# Patient Record
Sex: FEMALE | Race: OTHER | Employment: FULL TIME | URBAN - METROPOLITAN AREA
[De-identification: names, ages, dates, MRNs, and addresses within clinical notes are randomized per-mention and may not be internally consistent; named-entity substitution may affect disease eponyms.]

---

## 2020-02-24 ENCOUNTER — OFFICE VISIT (OUTPATIENT)
Dept: FAMILY MEDICINE CLINIC | Facility: CLINIC | Age: 35
End: 2020-02-24

## 2020-02-24 ENCOUNTER — LAB ENCOUNTER (OUTPATIENT)
Dept: LAB | Facility: REFERENCE LAB | Age: 35
End: 2020-02-24
Attending: FAMILY MEDICINE
Payer: COMMERCIAL

## 2020-02-24 VITALS
DIASTOLIC BLOOD PRESSURE: 62 MMHG | BODY MASS INDEX: 24.59 KG/M2 | OXYGEN SATURATION: 98 % | WEIGHT: 153 LBS | SYSTOLIC BLOOD PRESSURE: 104 MMHG | HEART RATE: 70 BPM | HEIGHT: 66 IN

## 2020-02-24 DIAGNOSIS — Z11.3 VENEREAL DISEASE SCREENING: ICD-10-CM

## 2020-02-24 DIAGNOSIS — Z23 NEED FOR VACCINATION: ICD-10-CM

## 2020-02-24 DIAGNOSIS — Z00.00 ENCOUNTER FOR ROUTINE ADULT HEALTH EXAMINATION WITHOUT ABNORMAL FINDINGS: ICD-10-CM

## 2020-02-24 DIAGNOSIS — Z00.00 ENCOUNTER FOR ROUTINE ADULT HEALTH EXAMINATION WITHOUT ABNORMAL FINDINGS: Primary | ICD-10-CM

## 2020-02-24 LAB
ALBUMIN SERPL-MCNC: 3.8 G/DL (ref 3.4–5)
ALBUMIN/GLOB SERPL: 1 {RATIO} (ref 1–2)
ALP LIVER SERPL-CCNC: 51 U/L (ref 37–98)
ALT SERPL-CCNC: 17 U/L (ref 13–56)
ANION GAP SERPL CALC-SCNC: 4 MMOL/L (ref 0–18)
AST SERPL-CCNC: 12 U/L (ref 15–37)
BASOPHILS # BLD AUTO: 0.02 X10(3) UL (ref 0–0.2)
BASOPHILS NFR BLD AUTO: 0.5 %
BILIRUB SERPL-MCNC: 0.3 MG/DL (ref 0.1–2)
BUN BLD-MCNC: 11 MG/DL (ref 7–18)
BUN/CREAT SERPL: 12.9 (ref 10–20)
CALCIUM BLD-MCNC: 8.8 MG/DL (ref 8.5–10.1)
CHLORIDE SERPL-SCNC: 109 MMOL/L (ref 98–112)
CHOLEST SMN-MCNC: 145 MG/DL (ref ?–200)
CO2 SERPL-SCNC: 28 MMOL/L (ref 21–32)
CREAT BLD-MCNC: 0.85 MG/DL (ref 0.55–1.02)
DEPRECATED RDW RBC AUTO: 40.2 FL (ref 35.1–46.3)
EOSINOPHIL # BLD AUTO: 0.07 X10(3) UL (ref 0–0.7)
EOSINOPHIL NFR BLD AUTO: 1.7 %
ERYTHROCYTE [DISTWIDTH] IN BLOOD BY AUTOMATED COUNT: 11.9 % (ref 11–15)
EST. AVERAGE GLUCOSE BLD GHB EST-MCNC: 97 MG/DL (ref 68–126)
GLOBULIN PLAS-MCNC: 3.7 G/DL (ref 2.8–4.4)
GLUCOSE BLD-MCNC: 90 MG/DL (ref 70–99)
HBA1C MFR BLD HPLC: 5 % (ref ?–5.7)
HCT VFR BLD AUTO: 37.6 % (ref 35–48)
HDLC SERPL-MCNC: 51 MG/DL (ref 40–59)
HGB BLD-MCNC: 12.2 G/DL (ref 12–16)
IMM GRANULOCYTES # BLD AUTO: 0.01 X10(3) UL (ref 0–1)
IMM GRANULOCYTES NFR BLD: 0.2 %
LDLC SERPL CALC-MCNC: 75 MG/DL (ref ?–100)
LYMPHOCYTES # BLD AUTO: 1.11 X10(3) UL (ref 1–4)
LYMPHOCYTES NFR BLD AUTO: 26.5 %
M PROTEIN MFR SERPL ELPH: 7.5 G/DL (ref 6.4–8.2)
MCH RBC QN AUTO: 29.8 PG (ref 26–34)
MCHC RBC AUTO-ENTMCNC: 32.4 G/DL (ref 31–37)
MCV RBC AUTO: 91.9 FL (ref 80–100)
MONOCYTES # BLD AUTO: 0.32 X10(3) UL (ref 0.1–1)
MONOCYTES NFR BLD AUTO: 7.6 %
NEUTROPHILS # BLD AUTO: 2.66 X10 (3) UL (ref 1.5–7.7)
NEUTROPHILS # BLD AUTO: 2.66 X10(3) UL (ref 1.5–7.7)
NEUTROPHILS NFR BLD AUTO: 63.5 %
NONHDLC SERPL-MCNC: 94 MG/DL (ref ?–130)
OSMOLALITY SERPL CALC.SUM OF ELEC: 291 MOSM/KG (ref 275–295)
PATIENT FASTING Y/N/NP: NO
PATIENT FASTING Y/N/NP: NO
PLATELET # BLD AUTO: 265 10(3)UL (ref 150–450)
POTASSIUM SERPL-SCNC: 3.9 MMOL/L (ref 3.5–5.1)
RBC # BLD AUTO: 4.09 X10(6)UL (ref 3.8–5.3)
SODIUM SERPL-SCNC: 141 MMOL/L (ref 136–145)
TRIGL SERPL-MCNC: 95 MG/DL (ref 30–149)
VLDLC SERPL CALC-MCNC: 19 MG/DL (ref 0–30)
WBC # BLD AUTO: 4.2 X10(3) UL (ref 4–11)

## 2020-02-24 PROCEDURE — 85025 COMPLETE CBC W/AUTO DIFF WBC: CPT

## 2020-02-24 PROCEDURE — 86780 TREPONEMA PALLIDUM: CPT

## 2020-02-24 PROCEDURE — 80061 LIPID PANEL: CPT

## 2020-02-24 PROCEDURE — 83036 HEMOGLOBIN GLYCOSYLATED A1C: CPT

## 2020-02-24 PROCEDURE — 80053 COMPREHEN METABOLIC PANEL: CPT

## 2020-02-24 PROCEDURE — 87591 N.GONORRHOEAE DNA AMP PROB: CPT

## 2020-02-24 PROCEDURE — 87491 CHLMYD TRACH DNA AMP PROBE: CPT

## 2020-02-24 PROCEDURE — 99385 PREV VISIT NEW AGE 18-39: CPT | Performed by: FAMILY MEDICINE

## 2020-02-24 PROCEDURE — G0438 PPPS, INITIAL VISIT: HCPCS | Performed by: FAMILY MEDICINE

## 2020-02-24 PROCEDURE — 90471 IMMUNIZATION ADMIN: CPT | Performed by: FAMILY MEDICINE

## 2020-02-24 PROCEDURE — 87389 HIV-1 AG W/HIV-1&-2 AB AG IA: CPT

## 2020-02-24 PROCEDURE — 36415 COLL VENOUS BLD VENIPUNCTURE: CPT

## 2020-02-24 PROCEDURE — 90686 IIV4 VACC NO PRSV 0.5 ML IM: CPT | Performed by: FAMILY MEDICINE

## 2020-02-24 NOTE — PATIENT INSTRUCTIONS
Prevention Guidelines, Women Ages 25 to 44  Screening tests and vaccines are an important part of managing your health. A screening test is done to find possible disorders or diseases in people who don't have any symptoms.  The goal is to find a disease e diabetes Lifelong testing every 3 years   Type 2 diabetes All women with prediabetes Every year   Gonorrhea Sexually active women at increased risk for infection At routine exams   Hepatitis C Anyone at increased risk At routine exams   HIV All women joelle Meningococcal Women at increased risk for infection should talk with their healthcare provider 1 or more doses   Pneumococcal conjugate vaccine (PCV13) and pneumococcal polysaccharide vaccine (PPSV23) Women at increased risk for infection should talk wit

## 2020-02-24 NOTE — PROGRESS NOTES
HPI:   Sherwin Gowers is a 37year old female who presents for a complete physical exam.     Last pap: 4 years ago and normal    Menses: Regular, monthly cycles   Contraception: Partner had vasectomy    History of STD's: None  History of intimate partner v EXAM:   Wt Readings from Last 6 Encounters:  02/24/20 : 153 lb (69.4 kg)    Body mass index is 24.69 kg/m².    /62   Pulse 70   Ht 66\"   Wt 153 lb (69.4 kg)   LMP 02/20/2020   SpO2 98%   BMI 24.69 kg/m²     GENERAL: well developed, well judy desires  -Recommendation for yearly influenza vaccine  -Need for Tdap once as an adult and Td booster every 10 years  -Contraception: Partner with vasectomy  -STI screening (GC/Chlamydia/HIV):  Checked today  -Hepatitis C screening for those born between [birth year]

## 2020-02-25 LAB
C TRACH DNA SPEC QL NAA+PROBE: NEGATIVE
N GONORRHOEA DNA SPEC QL NAA+PROBE: NEGATIVE

## 2020-02-26 LAB — T PALLIDUM AB SER QL: NEGATIVE

## 2020-06-06 ENCOUNTER — TELEPHONE (OUTPATIENT)
Dept: FAMILY MEDICINE CLINIC | Facility: CLINIC | Age: 35
End: 2020-06-06

## 2020-06-06 NOTE — TELEPHONE ENCOUNTER
Reports her boyfriend is a  and in his office they have had other workers who tested positive for Covid-19 as recently as Thursday. He was in the same car and sharing the cell phone of one of the police officers as well.  Her boyfriend has not

## 2020-09-02 ENCOUNTER — HOSPITAL ENCOUNTER (OUTPATIENT)
Age: 35
Discharge: HOME | End: 2020-09-02
Payer: COMMERCIAL

## 2020-09-02 VITALS
SYSTOLIC BLOOD PRESSURE: 124 MMHG | OXYGEN SATURATION: 100 % | BODY MASS INDEX: 24.91 KG/M2 | HEIGHT: 66 IN | RESPIRATION RATE: 16 BRPM | WEIGHT: 155 LBS | DIASTOLIC BLOOD PRESSURE: 75 MMHG | TEMPERATURE: 97 F | HEART RATE: 75 BPM

## 2020-09-02 DIAGNOSIS — R21 RASH OF GENITAL AREA: Primary | ICD-10-CM

## 2020-09-02 PROCEDURE — 99203 OFFICE O/P NEW LOW 30 MIN: CPT | Performed by: EMERGENCY MEDICINE

## 2020-09-02 RX ORDER — VALACYCLOVIR HYDROCHLORIDE 1 G/1
1 TABLET, FILM COATED ORAL EVERY 12 HOURS
Qty: 20 TABLET | Refills: 0 | Status: SHIPPED | OUTPATIENT
Start: 2020-09-02 | End: 2020-09-12

## 2020-09-02 NOTE — ED INITIAL ASSESSMENT (HPI)
Per pt noticed rash to pelvic area about 3 weeks reports worse in the last 2-3 days. Pt reports some itching.

## 2020-09-03 LAB
HSV1 DNA SPEC QL NAA+PROBE: NEGATIVE
HSV2 DNA SPEC QL NAA+PROBE: NEGATIVE

## 2020-09-03 NOTE — ED PROVIDER NOTES
Patient Seen in: [address]      History   Patient presents with:  Rash Skin Problem    Stated Complaint: Emelina Byrne is a 37year old female here for for rash to her pelvic area that started about 3 weeks ago Current:/75   Pulse 75   Temp 97 °F (36.1 °C) (Tympanic)   Resp 16   Ht 167.6 cm (5' 6\")   Wt 70.3 kg   LMP 08/30/2020   SpO2 100%   BMI 25.02 kg/m²         Physical Exam  Vitals signs and nursing note reviewed.    Constitutional:       Appearance: N Discharge Medication List as of 9/2/2020  5:01 PM    START taking these medications    valACYclovir HCl 1 G Oral Tab  Take 1 tablet (1,000 mg total) by mouth every 12 (twelve) hours for 10 days. , Normal, Disp-20 tablet, R-0

## 2020-09-16 ENCOUNTER — OFFICE VISIT (OUTPATIENT)
Dept: FAMILY MEDICINE CLINIC | Facility: CLINIC | Age: 35
End: 2020-09-16

## 2020-09-16 VITALS
SYSTOLIC BLOOD PRESSURE: 122 MMHG | HEART RATE: 81 BPM | DIASTOLIC BLOOD PRESSURE: 70 MMHG | TEMPERATURE: 98 F | WEIGHT: 153 LBS | BODY MASS INDEX: 24.59 KG/M2 | OXYGEN SATURATION: 98 % | HEIGHT: 66 IN

## 2020-09-16 DIAGNOSIS — R23.8 VESICULAR RASH: Primary | ICD-10-CM

## 2020-09-16 PROCEDURE — 99213 OFFICE O/P EST LOW 20 MIN: CPT | Performed by: FAMILY MEDICINE

## 2020-09-16 PROCEDURE — 3074F SYST BP LT 130 MM HG: CPT | Performed by: FAMILY MEDICINE

## 2020-09-16 PROCEDURE — 3078F DIAST BP <80 MM HG: CPT | Performed by: FAMILY MEDICINE

## 2020-09-16 PROCEDURE — 1111F DSCHRG MED/CURRENT MED MERGE: CPT | Performed by: FAMILY MEDICINE

## 2020-09-16 PROCEDURE — 3008F BODY MASS INDEX DOCD: CPT | Performed by: FAMILY MEDICINE

## 2020-09-16 NOTE — PROGRESS NOTES
HPI:    Patient ID: Alva Calloway is a 37year old female who presents for groin rash. HPI  Rash started more than 4 weeks ago in groin area. Fluctuates. No pain. Itches. Looks like small red bumps. Went to UC on 9/2 and dx with herpes.     Was diagnosis)    1. Vesicular rash  -Reviewed IC notes and results.   -Possibly 2/2 genital HSV despite negative culture. Vesicles are very small thus sample was likely difficult to obtain.   -Currently taking valtrex 1g BID for 10-day course.  Will complete a

## 2020-10-07 ENCOUNTER — IMMUNIZATION (OUTPATIENT)
Dept: FAMILY MEDICINE CLINIC | Facility: CLINIC | Age: 35
End: 2020-10-07

## 2020-10-07 DIAGNOSIS — Z23 NEED FOR VACCINATION: ICD-10-CM

## 2020-10-07 PROCEDURE — 90686 IIV4 VACC NO PRSV 0.5 ML IM: CPT | Performed by: FAMILY MEDICINE

## 2020-10-07 PROCEDURE — 90471 IMMUNIZATION ADMIN: CPT | Performed by: FAMILY MEDICINE

## 2021-02-03 ENCOUNTER — OFFICE VISIT (OUTPATIENT)
Dept: FAMILY MEDICINE CLINIC | Facility: CLINIC | Age: 36
End: 2021-02-03

## 2021-02-03 VITALS
OXYGEN SATURATION: 98 % | DIASTOLIC BLOOD PRESSURE: 74 MMHG | BODY MASS INDEX: 24.27 KG/M2 | HEIGHT: 66 IN | HEART RATE: 81 BPM | SYSTOLIC BLOOD PRESSURE: 120 MMHG | WEIGHT: 151 LBS

## 2021-02-03 DIAGNOSIS — M54.16 LUMBAR RADICULOPATHY: ICD-10-CM

## 2021-02-03 DIAGNOSIS — M25.542 ARTHRALGIA OF BOTH HANDS: ICD-10-CM

## 2021-02-03 DIAGNOSIS — M25.541 ARTHRALGIA OF BOTH HANDS: ICD-10-CM

## 2021-02-03 DIAGNOSIS — Z00.00 ENCOUNTER FOR ROUTINE ADULT HEALTH EXAMINATION WITHOUT ABNORMAL FINDINGS: Primary | ICD-10-CM

## 2021-02-03 DIAGNOSIS — R09.81 NASAL CONGESTION: ICD-10-CM

## 2021-02-03 DIAGNOSIS — M54.12 CERVICAL RADICULOPATHY: ICD-10-CM

## 2021-02-03 DIAGNOSIS — Z12.4 PAP SMEAR FOR CERVICAL CANCER SCREENING: ICD-10-CM

## 2021-02-03 PROCEDURE — 87624 HPV HI-RISK TYP POOLED RSLT: CPT | Performed by: FAMILY MEDICINE

## 2021-02-03 PROCEDURE — 88175 CYTOPATH C/V AUTO FLUID REDO: CPT | Performed by: FAMILY MEDICINE

## 2021-02-03 PROCEDURE — 3008F BODY MASS INDEX DOCD: CPT | Performed by: FAMILY MEDICINE

## 2021-02-03 PROCEDURE — 99395 PREV VISIT EST AGE 18-39: CPT | Performed by: FAMILY MEDICINE

## 2021-02-03 PROCEDURE — 3078F DIAST BP <80 MM HG: CPT | Performed by: FAMILY MEDICINE

## 2021-02-03 PROCEDURE — 3074F SYST BP LT 130 MM HG: CPT | Performed by: FAMILY MEDICINE

## 2021-02-03 NOTE — PROGRESS NOTES
HPI:   Jonny Zarate is a 31year old female who presents for a complete physical exam.     Has had more pain in her joints, especially her hands and feet. The pain fluctuates and was constant for months last year.  Does have arthritis in the family but no • Hypertension Mother    • Cancer Mother    • Lipids Maternal Grandmother    • Arthritis Maternal Grandmother       Social History:   Social History    Tobacco Use      Smoking status: Never Smoker      Smokeless tobacco: Never Used    Alcohol use: Not C Pep. IGG [E]      HCV Antibody [E]      Lipid Panel [E]      Hemoglobin A1C (Glycohemoglobin) [E]      Comp Metabolic Panel (14) [E]      CBC W Differential W Platelet [E]      Hpv High Risk , Thin Prep Collection      ThinPrep PAP Smear [E]    Referral to

## 2021-02-03 NOTE — PATIENT INSTRUCTIONS
Prevention Guidelines, Women Ages 25 to 44  Screening tests and vaccines are an important part of managing your health. A screening test is done to find possible disorders or diseases in people who don't have any symptoms.  The goal is to find a disease e Anyone at increased risk  At routine exams   HIV All women At routine exams3     Obesity All women in this age group  At routine exams   Syphilis Women at increased risk for infection should talk with their healthcare provider  At routine exams   Tuberculo (protects against 13 types of pneumococcal bacteria)   PPSV23: 1 to 2 doses through age 59, or 1 dose at 72 or older (protects against 23 types of pneumococcal bacteria)    Tetanus/diphtheria/pertussis (Td/Tdap) booster All women in this age group  Td ever not intended as a substitute for professional medical care. Always follow your healthcare professional's instructions.

## 2021-02-04 ENCOUNTER — LABORATORY ENCOUNTER (OUTPATIENT)
Dept: LAB | Facility: REFERENCE LAB | Age: 36
End: 2021-02-04
Attending: FAMILY MEDICINE
Payer: COMMERCIAL

## 2021-02-04 DIAGNOSIS — M25.542 ARTHRALGIA OF BOTH HANDS: ICD-10-CM

## 2021-02-04 DIAGNOSIS — Z00.00 ENCOUNTER FOR ROUTINE ADULT HEALTH EXAMINATION WITHOUT ABNORMAL FINDINGS: ICD-10-CM

## 2021-02-04 DIAGNOSIS — M25.541 ARTHRALGIA OF BOTH HANDS: ICD-10-CM

## 2021-02-04 LAB
ALBUMIN SERPL-MCNC: 3.8 G/DL (ref 3.4–5)
ALBUMIN/GLOB SERPL: 1 {RATIO} (ref 1–2)
ALP LIVER SERPL-CCNC: 53 U/L
ALT SERPL-CCNC: 19 U/L
ANION GAP SERPL CALC-SCNC: 2 MMOL/L (ref 0–18)
AST SERPL-CCNC: 11 U/L (ref 15–37)
BASOPHILS # BLD AUTO: 0.02 X10(3) UL (ref 0–0.2)
BASOPHILS NFR BLD AUTO: 0.4 %
BILIRUB SERPL-MCNC: 0.3 MG/DL (ref 0.1–2)
BUN BLD-MCNC: 16 MG/DL (ref 7–18)
BUN/CREAT SERPL: 20.3 (ref 10–20)
CALCIUM BLD-MCNC: 9.1 MG/DL (ref 8.5–10.1)
CHLORIDE SERPL-SCNC: 109 MMOL/L (ref 98–112)
CHOLEST SMN-MCNC: 161 MG/DL (ref ?–200)
CO2 SERPL-SCNC: 29 MMOL/L (ref 21–32)
CREAT BLD-MCNC: 0.79 MG/DL
DEPRECATED RDW RBC AUTO: 39.3 FL (ref 35.1–46.3)
EOSINOPHIL # BLD AUTO: 0.06 X10(3) UL (ref 0–0.7)
EOSINOPHIL NFR BLD AUTO: 1.2 %
ERYTHROCYTE [DISTWIDTH] IN BLOOD BY AUTOMATED COUNT: 11.7 % (ref 11–15)
EST. AVERAGE GLUCOSE BLD GHB EST-MCNC: 91 MG/DL (ref 68–126)
GLOBULIN PLAS-MCNC: 4 G/DL (ref 2.8–4.4)
GLUCOSE BLD-MCNC: 92 MG/DL (ref 70–99)
HBA1C MFR BLD HPLC: 4.8 % (ref ?–5.7)
HCT VFR BLD AUTO: 39.5 %
HCV AB SERPL QL IA: NONREACTIVE
HDLC SERPL-MCNC: 70 MG/DL (ref 40–59)
HGB BLD-MCNC: 12.8 G/DL
HPV I/H RISK 1 DNA SPEC QL NAA+PROBE: NEGATIVE
IMM GRANULOCYTES # BLD AUTO: 0.02 X10(3) UL (ref 0–1)
IMM GRANULOCYTES NFR BLD: 0.4 %
LDLC SERPL CALC-MCNC: 78 MG/DL (ref ?–100)
LYMPHOCYTES # BLD AUTO: 1.12 X10(3) UL (ref 1–4)
LYMPHOCYTES NFR BLD AUTO: 22.4 %
M PROTEIN MFR SERPL ELPH: 7.8 G/DL (ref 6.4–8.2)
MCH RBC QN AUTO: 29.8 PG (ref 26–34)
MCHC RBC AUTO-ENTMCNC: 32.4 G/DL (ref 31–37)
MCV RBC AUTO: 91.9 FL
MONOCYTES # BLD AUTO: 0.37 X10(3) UL (ref 0.1–1)
MONOCYTES NFR BLD AUTO: 7.4 %
NEUTROPHILS # BLD AUTO: 3.41 X10 (3) UL (ref 1.5–7.7)
NEUTROPHILS # BLD AUTO: 3.41 X10(3) UL (ref 1.5–7.7)
NEUTROPHILS NFR BLD AUTO: 68.2 %
NONHDLC SERPL-MCNC: 91 MG/DL (ref ?–130)
OSMOLALITY SERPL CALC.SUM OF ELEC: 291 MOSM/KG (ref 275–295)
PATIENT FASTING Y/N/NP: YES
PATIENT FASTING Y/N/NP: YES
PLATELET # BLD AUTO: 248 10(3)UL (ref 150–450)
POTASSIUM SERPL-SCNC: 4.1 MMOL/L (ref 3.5–5.1)
RBC # BLD AUTO: 4.3 X10(6)UL
RHEUMATOID FACT SERPL-ACNC: <10 IU/ML (ref ?–15)
SODIUM SERPL-SCNC: 140 MMOL/L (ref 136–145)
TRIGL SERPL-MCNC: 66 MG/DL (ref 30–149)
VLDLC SERPL CALC-MCNC: 13 MG/DL (ref 0–30)
WBC # BLD AUTO: 5 X10(3) UL (ref 4–11)

## 2021-02-04 PROCEDURE — 85025 COMPLETE CBC W/AUTO DIFF WBC: CPT

## 2021-02-04 PROCEDURE — 86803 HEPATITIS C AB TEST: CPT

## 2021-02-04 PROCEDURE — 80053 COMPREHEN METABOLIC PANEL: CPT

## 2021-02-04 PROCEDURE — 36415 COLL VENOUS BLD VENIPUNCTURE: CPT

## 2021-02-04 PROCEDURE — 83036 HEMOGLOBIN GLYCOSYLATED A1C: CPT

## 2021-02-04 PROCEDURE — 86431 RHEUMATOID FACTOR QUANT: CPT

## 2021-02-04 PROCEDURE — 86200 CCP ANTIBODY: CPT

## 2021-02-04 PROCEDURE — 80061 LIPID PANEL: CPT

## 2021-02-05 LAB — CCP IGG SERPL-ACNC: 1 U/ML (ref 0–6.9)

## 2021-02-08 LAB
LAST PAP RESULT: NORMAL
PAP HISTORY (OTHER THAN LAST PAP): NORMAL

## 2021-02-22 ENCOUNTER — OFFICE VISIT (OUTPATIENT)
Dept: OTOLARYNGOLOGY | Facility: CLINIC | Age: 36
End: 2021-02-22

## 2021-02-22 VITALS
BODY MASS INDEX: 24.27 KG/M2 | DIASTOLIC BLOOD PRESSURE: 76 MMHG | TEMPERATURE: 97 F | HEIGHT: 66 IN | SYSTOLIC BLOOD PRESSURE: 121 MMHG | WEIGHT: 151 LBS

## 2021-02-22 DIAGNOSIS — J34.2 DEVIATED NASAL SEPTUM: Primary | ICD-10-CM

## 2021-02-22 DIAGNOSIS — R09.81 NASAL CONGESTION: ICD-10-CM

## 2021-02-22 PROCEDURE — 3008F BODY MASS INDEX DOCD: CPT | Performed by: OTOLARYNGOLOGY

## 2021-02-22 PROCEDURE — 3078F DIAST BP <80 MM HG: CPT | Performed by: OTOLARYNGOLOGY

## 2021-02-22 PROCEDURE — 3074F SYST BP LT 130 MM HG: CPT | Performed by: OTOLARYNGOLOGY

## 2021-02-22 PROCEDURE — 99243 OFF/OP CNSLTJ NEW/EST LOW 30: CPT | Performed by: OTOLARYNGOLOGY

## 2021-02-22 RX ORDER — AZELASTINE 1 MG/ML
2 SPRAY, METERED NASAL 2 TIMES DAILY
Qty: 1 BOTTLE | Refills: 0 | Status: SHIPPED | OUTPATIENT
Start: 2021-02-22 | End: 2021-04-19

## 2021-02-22 RX ORDER — AMOXICILLIN 500 MG/1
500 CAPSULE ORAL EVERY 6 HOURS
COMMUNITY
Start: 2021-02-19 | End: 2021-03-23 | Stop reason: ALTCHOICE

## 2021-02-22 RX ORDER — MONTELUKAST SODIUM 10 MG/1
10 TABLET ORAL NIGHTLY
Qty: 30 TABLET | Refills: 3 | Status: SHIPPED | OUTPATIENT
Start: 2021-02-22 | End: 2021-03-23

## 2021-02-22 NOTE — PROGRESS NOTES
Gaston Stroud is a 31year old female.   Patient presents with:  Sinus Problem: Patient Presents with: Trouble breathing though nose   Ear Problem: swelling in both ears during night time for a while       HISTORY OF PRESENT ILLNESS  Difficulty breathing f depression. Integumentary Negative Frequent skin infections, pigment change and rash. Hema/Lymph Negative Easy bleeding and easy bruising.            PHYSICAL EXAM    /76 (BP Location: Left arm, Patient Position: Sitting, Cuff Size: adult)   Temp to see me in 1 month for reevaluation and discussion regarding further management of her deviated septum as her biggest complaints are obstructive in nature. This note was prepared using [employer] voice recognition dictation software.  As a resu

## 2021-03-22 ENCOUNTER — OFFICE VISIT (OUTPATIENT)
Dept: OTOLARYNGOLOGY | Facility: CLINIC | Age: 36
End: 2021-03-22

## 2021-03-22 VITALS
SYSTOLIC BLOOD PRESSURE: 117 MMHG | DIASTOLIC BLOOD PRESSURE: 75 MMHG | BODY MASS INDEX: 27.79 KG/M2 | WEIGHT: 151 LBS | HEIGHT: 62 IN | TEMPERATURE: 97 F

## 2021-03-22 DIAGNOSIS — J34.2 DEVIATED NASAL SEPTUM: Primary | ICD-10-CM

## 2021-03-22 PROCEDURE — 99214 OFFICE O/P EST MOD 30 MIN: CPT | Performed by: OTOLARYNGOLOGY

## 2021-03-22 PROCEDURE — 3074F SYST BP LT 130 MM HG: CPT | Performed by: OTOLARYNGOLOGY

## 2021-03-22 PROCEDURE — 3008F BODY MASS INDEX DOCD: CPT | Performed by: OTOLARYNGOLOGY

## 2021-03-22 PROCEDURE — 3078F DIAST BP <80 MM HG: CPT | Performed by: OTOLARYNGOLOGY

## 2021-03-23 ENCOUNTER — TELEPHONE (OUTPATIENT)
Dept: NEUROLOGY | Facility: CLINIC | Age: 36
End: 2021-03-23

## 2021-03-23 ENCOUNTER — HOSPITAL ENCOUNTER (OUTPATIENT)
Dept: GENERAL RADIOLOGY | Facility: HOSPITAL | Age: 36
Discharge: HOME | End: 2021-03-23
Attending: PHYSICAL MEDICINE & REHABILITATION
Payer: COMMERCIAL

## 2021-03-23 ENCOUNTER — OFFICE VISIT (OUTPATIENT)
Dept: NEUROLOGY | Facility: CLINIC | Age: 36
End: 2021-03-23

## 2021-03-23 VITALS — HEIGHT: 65 IN | WEIGHT: 150 LBS | BODY MASS INDEX: 24.99 KG/M2

## 2021-03-23 DIAGNOSIS — M54.12 CERVICAL RADICULOPATHY: ICD-10-CM

## 2021-03-23 DIAGNOSIS — M54.41 CHRONIC BILATERAL LOW BACK PAIN WITH BILATERAL SCIATICA: ICD-10-CM

## 2021-03-23 DIAGNOSIS — G89.29 CHRONIC BILATERAL THORACIC BACK PAIN: ICD-10-CM

## 2021-03-23 DIAGNOSIS — M54.12 CERVICAL RADICULOPATHY: Primary | ICD-10-CM

## 2021-03-23 DIAGNOSIS — M54.2 NECK PAIN: ICD-10-CM

## 2021-03-23 DIAGNOSIS — G89.29 CHRONIC BILATERAL LOW BACK PAIN WITH BILATERAL SCIATICA: ICD-10-CM

## 2021-03-23 DIAGNOSIS — M54.42 CHRONIC BILATERAL LOW BACK PAIN WITH BILATERAL SCIATICA: ICD-10-CM

## 2021-03-23 DIAGNOSIS — M54.6 CHRONIC BILATERAL THORACIC BACK PAIN: ICD-10-CM

## 2021-03-23 PROCEDURE — 72050 X-RAY EXAM NECK SPINE 4/5VWS: CPT | Performed by: PHYSICAL MEDICINE & REHABILITATION

## 2021-03-23 PROCEDURE — 3008F BODY MASS INDEX DOCD: CPT | Performed by: PHYSICAL MEDICINE & REHABILITATION

## 2021-03-23 PROCEDURE — 99244 OFF/OP CNSLTJ NEW/EST MOD 40: CPT | Performed by: PHYSICAL MEDICINE & REHABILITATION
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
## 2021-03-23 NOTE — PATIENT INSTRUCTIONS
Plan  She will get cervical spine x-rays. She will get a MRI of the cervical spine. She will start PT on the cervical and thoracic spines. I will address her low back issues once her neck is doing better.     She will follow up in 2 months or rajiv

## 2021-03-23 NOTE — TELEPHONE ENCOUNTER
Sent clinical notes to [facility] for authorization of 8 PT sessions CPT Codes: 86639,08432.

## 2021-03-23 NOTE — PROGRESS NOTES
Rachid Back is a 31year old female. Patient presents with:   Follow - Up: Patient here for f/u regarding Deviated nasal septum, per pt  breathing is better with medications      HISTORY OF PRESENT ILLNESS  Difficulty breathing from her nose all the eliana Transportation (Medical):       Lack of Transportation (Non-Medical):   Physical Activity:       Days of Exercise per Week:       Minutes of Exercise per Session:   Stress:       Feeling of Stress :   Social Connections:       Frequency of Communication wi Exam Normal Inspection - Normal. Palpation - Normal. Parotid gland - Normal. Thyroid gland - Normal.   Eyes Normal Conjunctiva - Right: Normal, Left: Normal. Pupil - Right: Normal, Left: Normal. Fundus - Right: Normal, Left: Normal.   Neurological Normal M discussed an alternative in the form of septoplasty and turbinate reduction.   We specifically discussed the risk of surgery to include but not be limited to postoperative pain, bleeding, the anesthesia use as well as persistent nasal obstruction despite brown
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
## 2021-03-24 NOTE — PATIENT INSTRUCTIONS
Depression  Depression is one of the most common mental health problems today. It is not just a state of unhappiness or sadness. It is a true disease. The cause seems to be linked to a drop in chemicals that transmit signals in the brain.  Having a family (low in saturated fat and high in fruits and vegetables). Exercise at least 3 times a week for 30 minutes. Even mild to moderate exercise (like brisk walking) can make you feel better.   · Don't make major decisions, such as a job change, a divorce, or a ma hear  · Seeing things that others do not see  · Not sleeping or eating for 3 days in a row  · Having friends or family express concern over your behavior and ask you to seek help  Dyllan last reviewed this educational content on 7/1/2020 © 2000-2020 The

## 2021-03-25 NOTE — TELEPHONE ENCOUNTER
Received notice of Approval from [address] [place] [address] at [place] for 8 visits of PT valid until 6/25/21 to be done at [facility]    Left detailed message informing patient of the above and provided # to [facility] PT.  mychart message also sent to patient

## 2021-03-26 ENCOUNTER — TELEPHONE (OUTPATIENT)
Dept: NEUROLOGY | Facility: CLINIC | Age: 36
End: 2021-03-26

## 2021-03-26 ENCOUNTER — PATIENT MESSAGE (OUTPATIENT)
Dept: NEUROLOGY | Facility: CLINIC | Age: 36
End: 2021-03-26

## 2021-03-26 DIAGNOSIS — M54.2 NECK PAIN: Primary | ICD-10-CM

## 2021-03-26 RX ORDER — CYCLOBENZAPRINE HCL 10 MG
10 TABLET ORAL 2 TIMES DAILY PRN
Qty: 30 TABLET | Refills: 0 | Status: SHIPPED | OUTPATIENT
Start: 2021-03-26 | End: 2021-04-19

## 2021-03-26 RX ORDER — METHYLPREDNISOLONE 4 MG/1
TABLET ORAL
Qty: 1 PACKAGE | Refills: 0 | Status: SHIPPED | OUTPATIENT
Start: 2021-03-26 | End: 2021-04-19

## 2021-03-26 NOTE — TELEPHONE ENCOUNTER
From: Disha Krause  To: Yaritza Jackson MD  Sent: 3/26/2021 4:21 AM CDT  Subject: Prescription Question    It is 4:18am at the moment and I have been up since 2 when the pain woke me up. Is there something you can prescribe to help me with this pain.  My

## 2021-03-26 NOTE — TELEPHONE ENCOUNTER
Patient stated she scheduled PT and has not started. Patient has MRI scheduled and completed Xray. Patient states she Ibuprofen but does not help. Patient states she has a hard time getting out of bed. Patient rates pain 9/10. No loss of B/B function.  Xenia

## 2021-03-26 NOTE — TELEPHONE ENCOUNTER
Per Dr Rikki Sparks - check if patient has neck or back pain and if pain is radiating.  Can prescribe Medrol dose timothy and a muscle relaxer       Spoke with Patient who stated she is experiencing  intense pain on her upper left side of her neck radiating from the

## 2021-03-27 ENCOUNTER — HOSPITAL ENCOUNTER (OUTPATIENT)
Dept: MRI IMAGING | Facility: HOSPITAL | Age: 36
Discharge: HOME | End: 2021-03-27
Attending: PHYSICAL MEDICINE & REHABILITATION
Payer: COMMERCIAL

## 2021-03-27 DIAGNOSIS — M54.2 NECK PAIN: ICD-10-CM

## 2021-03-27 DIAGNOSIS — M54.12 CERVICAL RADICULOPATHY: ICD-10-CM

## 2021-03-27 PROCEDURE — 72141 MRI NECK SPINE W/O DYE: CPT | Performed by: PHYSICAL MEDICINE & REHABILITATION

## 2021-03-29 ENCOUNTER — TELEPHONE (OUTPATIENT)
Dept: NEUROLOGY | Facility: CLINIC | Age: 36
End: 2021-03-29

## 2021-03-29 NOTE — TELEPHONE ENCOUNTER
----- Message from Jayjay Jimenez MD sent at 3/26/2021  3:13 PM CDT -----  She has straightening of the cervical spine but otherwise, her x-ray is normal.

## 2021-03-29 NOTE — TELEPHONE ENCOUNTER
LMTCB    Xray: \"She has straightening of the cervical spine but otherwise, her x-ray is normal.\"

## 2021-03-30 PROBLEM — M48.02 CERVICAL STENOSIS OF SPINE: Status: ACTIVE | Noted: 2021-03-30

## 2021-03-30 PROBLEM — M50.20 CERVICAL HERNIATED DISC: Status: ACTIVE | Noted: 2021-03-30

## 2021-03-30 PROBLEM — M50.90 CERVICAL DISC DISEASE: Status: ACTIVE | Noted: 2021-03-30

## 2021-03-30 NOTE — TELEPHONE ENCOUNTER
I reviewed her MRI scan and she has 2 herniated discs and 2 bulging discs. I would like to see how she does with 3-4 weeks of the PT. I would like for her to give me an update at that time.

## 2021-03-30 NOTE — TELEPHONE ENCOUNTER
S/W patient and she verbalized she understood the MRI and Xray. Patient states she will let us know how she does with PT in a few weeks. Patient states the steroid has helped with the back pain but does cause mild dizziness.

## 2021-04-08 ENCOUNTER — TELEPHONE (OUTPATIENT)
Dept: PHYSICAL THERAPY | Facility: HOSPITAL | Age: 36
End: 2021-04-08

## 2021-04-09 ENCOUNTER — OFFICE VISIT (OUTPATIENT)
Dept: PHYSICAL THERAPY | Facility: HOSPITAL | Age: 36
End: 2021-04-09
Attending: PHYSICAL MEDICINE & REHABILITATION
Payer: COMMERCIAL

## 2021-04-09 DIAGNOSIS — G89.29 CHRONIC BILATERAL LOW BACK PAIN WITH BILATERAL SCIATICA: ICD-10-CM

## 2021-04-09 DIAGNOSIS — M54.12 CERVICAL RADICULOPATHY: ICD-10-CM

## 2021-04-09 DIAGNOSIS — M54.2 NECK PAIN: ICD-10-CM

## 2021-04-09 DIAGNOSIS — M54.6 CHRONIC BILATERAL THORACIC BACK PAIN: ICD-10-CM

## 2021-04-09 DIAGNOSIS — G89.29 CHRONIC BILATERAL THORACIC BACK PAIN: ICD-10-CM

## 2021-04-09 DIAGNOSIS — M54.41 CHRONIC BILATERAL LOW BACK PAIN WITH BILATERAL SCIATICA: ICD-10-CM

## 2021-04-09 DIAGNOSIS — M54.42 CHRONIC BILATERAL LOW BACK PAIN WITH BILATERAL SCIATICA: ICD-10-CM

## 2021-04-09 PROCEDURE — 97110 THERAPEUTIC EXERCISES: CPT

## 2021-04-09 PROCEDURE — 97162 PT EVAL MOD COMPLEX 30 MIN: CPT

## 2021-04-09 NOTE — PROGRESS NOTES
SPINE EVALUATION:   Referring Physician: Dr. Lucia Samuels  Diagnosis:  Cervical radiculopathy (M54.12)  Neck pain (M54.2)  Chronic bilateral thoracic back pain (M54.6,G89.29)  Chronic bilateral low back pain with bilateral sciatica (M54.42,M54.41,G89.29)    Date reviewed with Wenda Ormond. Significant findings include  has a past medical history of Cervical herniated disc and Postpartum depression.     Nafisa Vazquez presents to physical therapy evaluation with primary c/o intermittent pain in B neck and (L) sc and JOSEPH with precautions    Charges: PT Eval Moderate Complexity, There Ex 1      Total Timed Treatment: 15 min     Total Treatment Time: 50 min     Based on clinical rationale and outcome measures, this evaluation involved Moderate Complexity decision ma care.    X___________________________________________________ Date____________________    Certification From: 6/7/4269  To:7/8/2021

## 2021-04-12 ENCOUNTER — OFFICE VISIT (OUTPATIENT)
Dept: PHYSICAL THERAPY | Facility: HOSPITAL | Age: 36
End: 2021-04-12
Attending: PHYSICAL MEDICINE & REHABILITATION
Payer: COMMERCIAL

## 2021-04-12 DIAGNOSIS — G89.29 CHRONIC BILATERAL THORACIC BACK PAIN: ICD-10-CM

## 2021-04-12 DIAGNOSIS — M54.42 CHRONIC BILATERAL LOW BACK PAIN WITH BILATERAL SCIATICA: ICD-10-CM

## 2021-04-12 DIAGNOSIS — M54.12 CERVICAL RADICULOPATHY: ICD-10-CM

## 2021-04-12 DIAGNOSIS — G89.29 CHRONIC BILATERAL LOW BACK PAIN WITH BILATERAL SCIATICA: ICD-10-CM

## 2021-04-12 DIAGNOSIS — M54.41 CHRONIC BILATERAL LOW BACK PAIN WITH BILATERAL SCIATICA: ICD-10-CM

## 2021-04-12 DIAGNOSIS — M54.2 NECK PAIN: ICD-10-CM

## 2021-04-12 DIAGNOSIS — M54.6 CHRONIC BILATERAL THORACIC BACK PAIN: ICD-10-CM

## 2021-04-12 PROCEDURE — 97140 MANUAL THERAPY 1/> REGIONS: CPT

## 2021-04-12 PROCEDURE — 97110 THERAPEUTIC EXERCISES: CPT

## 2021-04-12 NOTE — PROGRESS NOTES
Diagnosis:  Cervical radiculopathy (M54.12)  Neck pain (M54.2)  Chronic bilateral thoracic back pain (M54.6,G89.29)  Chronic bilateral low back pain with bilateral sciatica (M54.42,M54.41,G89.29)   Precautions: none  Insurance Type (# Auth): Paula Mullen [employer] O (8) T

## 2021-04-15 ENCOUNTER — PATIENT MESSAGE (OUTPATIENT)
Dept: FAMILY MEDICINE CLINIC | Facility: CLINIC | Age: 36
End: 2021-04-15

## 2021-04-16 ENCOUNTER — OFFICE VISIT (OUTPATIENT)
Dept: PHYSICAL THERAPY | Facility: HOSPITAL | Age: 36
End: 2021-04-16
Attending: PHYSICAL MEDICINE & REHABILITATION
Payer: COMMERCIAL

## 2021-04-16 DIAGNOSIS — M54.2 NECK PAIN: ICD-10-CM

## 2021-04-16 DIAGNOSIS — G89.29 CHRONIC BILATERAL LOW BACK PAIN WITH BILATERAL SCIATICA: ICD-10-CM

## 2021-04-16 DIAGNOSIS — M54.12 CERVICAL RADICULOPATHY: ICD-10-CM

## 2021-04-16 DIAGNOSIS — M54.6 CHRONIC BILATERAL THORACIC BACK PAIN: ICD-10-CM

## 2021-04-16 DIAGNOSIS — M54.41 CHRONIC BILATERAL LOW BACK PAIN WITH BILATERAL SCIATICA: ICD-10-CM

## 2021-04-16 DIAGNOSIS — M54.42 CHRONIC BILATERAL LOW BACK PAIN WITH BILATERAL SCIATICA: ICD-10-CM

## 2021-04-16 DIAGNOSIS — G89.29 CHRONIC BILATERAL THORACIC BACK PAIN: ICD-10-CM

## 2021-04-16 PROCEDURE — 97110 THERAPEUTIC EXERCISES: CPT

## 2021-04-16 PROCEDURE — 97140 MANUAL THERAPY 1/> REGIONS: CPT

## 2021-04-16 NOTE — TELEPHONE ENCOUNTER
Pt contacted and scheduled for video visit 4/19. Natividad Grover DO 4/15/2021 11:33 PM CDT    Please schedule video visit follow-up to discuss.   ----- Message -----  From: Karl Aguayo RN  Sent: 4/15/2021   4:38 PM CDT  To: Kendrick Edwards DO  Subject: FW

## 2021-04-16 NOTE — PROGRESS NOTES
Diagnosis:  Cervical radiculopathy (M54.12)  Neck pain (M54.2)  Chronic bilateral thoracic back pain (M54.6,G89.29)  Chronic bilateral low back pain with bilateral sciatica (M54.42,M54.41,G89.29)   Precautions: none  Insurance Type (# Auth): [facility] (8) T central mid cervical

## 2021-04-19 ENCOUNTER — OFFICE VISIT (OUTPATIENT)
Dept: PHYSICAL THERAPY | Facility: HOSPITAL | Age: 36
End: 2021-04-19
Attending: PHYSICAL MEDICINE & REHABILITATION
Payer: COMMERCIAL

## 2021-04-19 DIAGNOSIS — M54.6 CHRONIC BILATERAL THORACIC BACK PAIN: ICD-10-CM

## 2021-04-19 DIAGNOSIS — G89.29 CHRONIC BILATERAL THORACIC BACK PAIN: ICD-10-CM

## 2021-04-19 DIAGNOSIS — M54.2 NECK PAIN: ICD-10-CM

## 2021-04-19 DIAGNOSIS — M54.42 CHRONIC BILATERAL LOW BACK PAIN WITH BILATERAL SCIATICA: ICD-10-CM

## 2021-04-19 DIAGNOSIS — G89.29 CHRONIC BILATERAL LOW BACK PAIN WITH BILATERAL SCIATICA: ICD-10-CM

## 2021-04-19 DIAGNOSIS — M54.41 CHRONIC BILATERAL LOW BACK PAIN WITH BILATERAL SCIATICA: ICD-10-CM

## 2021-04-19 DIAGNOSIS — M54.12 CERVICAL RADICULOPATHY: ICD-10-CM

## 2021-04-19 PROBLEM — F41.9 ANXIETY: Status: ACTIVE | Noted: 2021-04-19

## 2021-04-19 PROCEDURE — 97140 MANUAL THERAPY 1/> REGIONS: CPT

## 2021-04-19 PROCEDURE — 97110 THERAPEUTIC EXERCISES: CPT

## 2021-04-19 NOTE — PROGRESS NOTES
CC:  Patient presents with: Follow - Up: medication follow up      HPI: 31year old female presenting for video visit to follow-up on Sertraline for depression and anxiety. Started Sertraline on 3/24 but concerned it is triggering insomnia.   Initially to Asked    Social History Narrative      Not on file    Social Determinants of Health  Financial Resource Strain:       Difficulty of Paying Living Expenses:   Food Insecurity:       Worried About [employer] in the Last Year:       Ran Out of Food i needed for panic attacks while waiting for Lexapro to take effect  - Warning signs and ED precautions reviewed and she will follow-up via video visit in 4-5 week or sooner if concerns    2.  Reactive depression    - Much improved and was likely situational

## 2021-04-23 ENCOUNTER — OFFICE VISIT (OUTPATIENT)
Dept: PHYSICAL THERAPY | Facility: HOSPITAL | Age: 36
End: 2021-04-23
Attending: PHYSICAL MEDICINE & REHABILITATION
Payer: COMMERCIAL

## 2021-04-23 PROCEDURE — 97110 THERAPEUTIC EXERCISES: CPT

## 2021-04-23 PROCEDURE — 97140 MANUAL THERAPY 1/> REGIONS: CPT

## 2021-04-23 NOTE — PROGRESS NOTES
Diagnosis:  Cervical radiculopathy (M54.12)  Neck pain (M54.2)  Chronic bilateral thoracic back pain (M54.6,G89.29)  Chronic bilateral low back pain with bilateral sciatica (M54.42,M54.41,G89.29)   Precautions: none  Insurance Type (# Auth): [facility] (8) T centralization. AROM increased      Plan: Cont STM/MFR. Review of strengthening of cervical stabilizers. From eval for reference only:  complaints of:  · intermittent B neck pain, sometimes generalized and sometimes suboccipital = 0 - 10/10.  Aggravates:

## 2021-04-26 ENCOUNTER — OFFICE VISIT (OUTPATIENT)
Dept: PHYSICAL THERAPY | Facility: HOSPITAL | Age: 36
End: 2021-04-26
Attending: PHYSICAL MEDICINE & REHABILITATION
Payer: COMMERCIAL

## 2021-04-26 DIAGNOSIS — M54.12 CERVICAL RADICULOPATHY: ICD-10-CM

## 2021-04-26 DIAGNOSIS — G89.29 CHRONIC BILATERAL THORACIC BACK PAIN: ICD-10-CM

## 2021-04-26 DIAGNOSIS — M54.41 CHRONIC BILATERAL LOW BACK PAIN WITH BILATERAL SCIATICA: ICD-10-CM

## 2021-04-26 DIAGNOSIS — G89.29 CHRONIC BILATERAL LOW BACK PAIN WITH BILATERAL SCIATICA: ICD-10-CM

## 2021-04-26 DIAGNOSIS — M54.6 CHRONIC BILATERAL THORACIC BACK PAIN: ICD-10-CM

## 2021-04-26 DIAGNOSIS — M54.42 CHRONIC BILATERAL LOW BACK PAIN WITH BILATERAL SCIATICA: ICD-10-CM

## 2021-04-26 DIAGNOSIS — M54.2 NECK PAIN: ICD-10-CM

## 2021-04-26 PROCEDURE — 97140 MANUAL THERAPY 1/> REGIONS: CPT

## 2021-04-26 NOTE — PROGRESS NOTES
Diagnosis:  Cervical radiculopathy (M54.12)  Neck pain (M54.2)  Chronic bilateral thoracic back pain (M54.6,G89.29)  Chronic bilateral low back pain with bilateral sciatica (M54.42,M54.41,G89.29)   Precautions: none  Insurance Type (# Auth): Paula Mullen 150 O (8) T flared up. Relieves: anti-inflammatory meds. · Burning pain in (L) posterior proximal arm to elbow = 0-5-7/10. Aggravates: nothing; Relieves: nothing  · Intermittent pain in (L) scapula = 0-10/10 occurring 3-4x/wk.    · Weakness in (B) hands

## 2021-04-28 ENCOUNTER — PATIENT MESSAGE (OUTPATIENT)
Dept: NEUROLOGY | Facility: CLINIC | Age: 36
End: 2021-04-28

## 2021-04-28 NOTE — TELEPHONE ENCOUNTER
From: Juan José Salazar  To: Kassandra Lopes MD  Sent: 4/28/2021 2:26 PM CDT  Subject: Other    Thank you for the letter.  What I need is the letter to inform my work of specifically chronic pain and immobility it causes when there is a flare up so they can al

## 2021-04-28 NOTE — TELEPHONE ENCOUNTER
S/W patient and she stated she needs a work note saying she can work from home 3 days a week. Patient states she has  trouble driving and walking. Patient states she can work from home.  Patient states she would 3 days a week working from home if patient h

## 2021-04-28 NOTE — TELEPHONE ENCOUNTER
From: Melecio Mccarthy  To: Khloe Samaniego MD  Sent: 4/28/2021 10:40 AM CDT  Subject: Other    Greetings.      I was going to discuss this for the appointment that was reschedule by your office for May 3 and now they are saying it will not be till July which

## 2021-04-30 ENCOUNTER — OFFICE VISIT (OUTPATIENT)
Dept: PHYSICAL THERAPY | Facility: HOSPITAL | Age: 36
End: 2021-04-30
Attending: PHYSICAL MEDICINE & REHABILITATION
Payer: COMMERCIAL

## 2021-04-30 DIAGNOSIS — G89.29 CHRONIC BILATERAL THORACIC BACK PAIN: ICD-10-CM

## 2021-04-30 DIAGNOSIS — M54.12 CERVICAL RADICULOPATHY: ICD-10-CM

## 2021-04-30 DIAGNOSIS — M54.6 CHRONIC BILATERAL THORACIC BACK PAIN: ICD-10-CM

## 2021-04-30 DIAGNOSIS — M54.42 CHRONIC BILATERAL LOW BACK PAIN WITH BILATERAL SCIATICA: ICD-10-CM

## 2021-04-30 DIAGNOSIS — M54.41 CHRONIC BILATERAL LOW BACK PAIN WITH BILATERAL SCIATICA: ICD-10-CM

## 2021-04-30 DIAGNOSIS — G89.29 CHRONIC BILATERAL LOW BACK PAIN WITH BILATERAL SCIATICA: ICD-10-CM

## 2021-04-30 DIAGNOSIS — M54.2 NECK PAIN: ICD-10-CM

## 2021-04-30 PROCEDURE — 97110 THERAPEUTIC EXERCISES: CPT

## 2021-04-30 PROCEDURE — 97140 MANUAL THERAPY 1/> REGIONS: CPT

## 2021-04-30 NOTE — PROGRESS NOTES
Fanny  Pt has attended 7 visits in Physical Therapy.      Diagnosis:    Cervical radiculopathy (M54.12)  Neck pain (M54.2)  Chronic bilateral thoracic back pain (M54.6,G89.29)  Chronic bilateral low back pain with bilateral sciatica (M54.42,M54 Number: 7 / 8

## 2021-07-01 ENCOUNTER — OFFICE VISIT (OUTPATIENT)
Dept: NEUROLOGY | Facility: CLINIC | Age: 36
End: 2021-07-01

## 2021-07-01 VITALS
WEIGHT: 150 LBS | DIASTOLIC BLOOD PRESSURE: 70 MMHG | BODY MASS INDEX: 24.99 KG/M2 | SYSTOLIC BLOOD PRESSURE: 120 MMHG | HEIGHT: 65 IN

## 2021-07-01 DIAGNOSIS — M54.12 CERVICAL RADICULOPATHY: Primary | ICD-10-CM

## 2021-07-01 DIAGNOSIS — M48.02 CERVICAL STENOSIS OF SPINE: ICD-10-CM

## 2021-07-01 DIAGNOSIS — M50.90 CERVICAL DISC DISEASE: ICD-10-CM

## 2021-07-01 DIAGNOSIS — M54.2 NECK PAIN: ICD-10-CM

## 2021-07-01 DIAGNOSIS — M50.20 CERVICAL HERNIATED DISC: ICD-10-CM

## 2021-07-01 PROCEDURE — 3008F BODY MASS INDEX DOCD: CPT | Performed by: PHYSICAL MEDICINE & REHABILITATION

## 2021-07-01 PROCEDURE — 3074F SYST BP LT 130 MM HG: CPT | Performed by: PHYSICAL MEDICINE & REHABILITATION

## 2021-07-01 PROCEDURE — 99214 OFFICE O/P EST MOD 30 MIN: CPT | Performed by: PHYSICAL MEDICINE & REHABILITATION

## 2021-07-01 PROCEDURE — 3078F DIAST BP <80 MM HG: CPT | Performed by: PHYSICAL MEDICINE & REHABILITATION

## 2021-07-01 NOTE — PROGRESS NOTES
Cervical Pain H & P    Chief Complaint:  Patient presents with:  Neck Pain: pt here for f/u neck pain, LOV 3/23/21  pt states no improvement with PT.  rates pain 0/10   take ibupron as needed for pain. Nursing note reviewed and verified.     Patient was Tobacco Use      Smoking status: Never Smoker      Smokeless tobacco: Never Used    Vaping Use      Vaping Use: Never used    Substance and Sexual Activity      Alcohol use: Not Currently      Drug use: Never      Sexual activity: Yes        Partners:  Mal bilaterally. Skin:  There are no rashes or lesions. Lymph Nodes: The patient has no palpable submandibular, supraclavicular, and cervical lymph nodes. .    Vitals:   07/01/21  1322   BP: 120/70       Cervical Spine:    Posture: mild chin forward supe

## 2021-07-01 NOTE — PATIENT INSTRUCTIONS
Plan  She will continue with her running and core strengthening. She would like to hold on taking oral steroids or having a C7-T1 ILESI at this time. The patient does not need any pain medications at this time.     She will follow up in 3 months or

## 2021-09-20 ENCOUNTER — OFFICE VISIT (OUTPATIENT)
Dept: PHYSICAL MEDICINE AND REHAB | Facility: CLINIC | Age: 36
End: 2021-09-20

## 2021-09-20 VITALS
SYSTOLIC BLOOD PRESSURE: 100 MMHG | HEIGHT: 65 IN | WEIGHT: 150 LBS | DIASTOLIC BLOOD PRESSURE: 60 MMHG | BODY MASS INDEX: 24.99 KG/M2

## 2021-09-20 DIAGNOSIS — M48.02 CERVICAL STENOSIS OF SPINE: ICD-10-CM

## 2021-09-20 DIAGNOSIS — M54.6 CHRONIC BILATERAL THORACIC BACK PAIN: ICD-10-CM

## 2021-09-20 DIAGNOSIS — G89.29 CHRONIC BILATERAL THORACIC BACK PAIN: ICD-10-CM

## 2021-09-20 DIAGNOSIS — M54.2 NECK PAIN: ICD-10-CM

## 2021-09-20 DIAGNOSIS — M54.16 LUMBAR RADICULOPATHY: ICD-10-CM

## 2021-09-20 DIAGNOSIS — M50.20 CERVICAL HERNIATED DISC: Primary | ICD-10-CM

## 2021-09-20 DIAGNOSIS — M79.642 BILATERAL HAND PAIN: ICD-10-CM

## 2021-09-20 DIAGNOSIS — M50.90 CERVICAL DISC DISEASE: ICD-10-CM

## 2021-09-20 DIAGNOSIS — M79.672 BILATERAL FOOT PAIN: ICD-10-CM

## 2021-09-20 DIAGNOSIS — M79.671 BILATERAL FOOT PAIN: ICD-10-CM

## 2021-09-20 DIAGNOSIS — M79.641 BILATERAL HAND PAIN: ICD-10-CM

## 2021-09-20 PROBLEM — M54.12 CERVICAL RADICULOPATHY: Status: RESOLVED | Noted: 2021-03-23 | Resolved: 2021-09-20

## 2021-09-20 PROCEDURE — 3008F BODY MASS INDEX DOCD: CPT | Performed by: PHYSICAL MEDICINE & REHABILITATION

## 2021-09-20 PROCEDURE — 3078F DIAST BP <80 MM HG: CPT | Performed by: PHYSICAL MEDICINE & REHABILITATION

## 2021-09-20 PROCEDURE — 99214 OFFICE O/P EST MOD 30 MIN: CPT | Performed by: PHYSICAL MEDICINE & REHABILITATION

## 2021-09-20 PROCEDURE — 3074F SYST BP LT 130 MM HG: CPT | Performed by: PHYSICAL MEDICINE & REHABILITATION

## 2021-09-20 NOTE — PATIENT INSTRUCTIONS
Plan  I will perform an EMG of the bilateral arms and hands and then on another day bilateral legs. She will continue with her running.     She will look into doing a Pilates program.    She will follow up after I have done the bilateral upper and lowe

## 2021-09-20 NOTE — PROGRESS NOTES
Low Back Pain H & P    Chief Complaint: Patient presents with:  Neck Pain: Pt is here f/u neck pain, LOV 7/1/21  pt states is doing exercise on her own with some improvement. rates pain 3/10. Nursing note reviewed and verified.     Patient was last s file      Highest education level: Not on file    Occupational History      Not on file    Tobacco Use      Smoking status: Never Smoker      Smokeless tobacco: Never Used    Vaping Use      Vaping Use: Never used    Substance and Sexual Activity      [employer]  No patient-reported data collected this visit. PE:  The patient does appear in her stated age in no distress. The patient is well groomed. Psychiatric:  The patient is alert and oriented x 3. The patient has a normal affect and mood. Vascular lower extremity:   Dorsalis pedis pulse-RIGHT 2+   Dorsalis pedis pulse-LEFT 2+   Tibialis posterior pulse-RIGHT 2+   Tibialis posterior pulse-LEFT 2+     Neurological Lower Extremity:    Light Touch Sensation: Intact in bilateral Lower Extrem

## 2021-09-28 ENCOUNTER — OFFICE VISIT (OUTPATIENT)
Dept: FAMILY MEDICINE CLINIC | Facility: CLINIC | Age: 36
End: 2021-09-28

## 2021-09-28 VITALS
OXYGEN SATURATION: 99 % | WEIGHT: 151 LBS | HEIGHT: 65 IN | HEART RATE: 71 BPM | BODY MASS INDEX: 25.16 KG/M2 | DIASTOLIC BLOOD PRESSURE: 70 MMHG | SYSTOLIC BLOOD PRESSURE: 110 MMHG

## 2021-09-28 DIAGNOSIS — M77.42 METATARSALGIA OF BOTH FEET: Primary | ICD-10-CM

## 2021-09-28 DIAGNOSIS — L85.3 DRY SKIN DERMATITIS: ICD-10-CM

## 2021-09-28 DIAGNOSIS — M77.41 METATARSALGIA OF BOTH FEET: Primary | ICD-10-CM

## 2021-09-28 PROCEDURE — 99214 OFFICE O/P EST MOD 30 MIN: CPT | Performed by: FAMILY MEDICINE

## 2021-09-28 PROCEDURE — 3008F BODY MASS INDEX DOCD: CPT | Performed by: FAMILY MEDICINE

## 2021-09-28 PROCEDURE — 3078F DIAST BP <80 MM HG: CPT | Performed by: FAMILY MEDICINE

## 2021-09-28 PROCEDURE — 3074F SYST BP LT 130 MM HG: CPT | Performed by: FAMILY MEDICINE

## 2021-09-28 RX ORDER — TRIAMCINOLONE ACETONIDE 0.25 MG/G
1 OINTMENT TOPICAL 2 TIMES DAILY
Qty: 80 G | Refills: 1 | Status: SHIPPED | OUTPATIENT
Start: 2021-09-28 | End: 2021-10-12

## 2021-09-28 NOTE — PROGRESS NOTES
HPI:    Patient ID: Rich Downs is a 31year old female who presents for foot issues. HPI  Thinks she has fungus on feet. Feet have always been dry. Moreso on heel. Whole foot is a callus. Nails are crumbling. Itching. No pain.    No treatme diagnosis)  Dry skin dermatitis    1. Metatarsalgia of both feet  - PODIATRY - INTERNAL    2. Dry skin dermatitis     -Acute on chronic.  -Callus formation likely 2/2 metatarsalgia. Discussed treatment.  Recommended metatarsalgia donut pads and regular pumi

## 2021-09-28 NOTE — PATIENT INSTRUCTIONS
Treating Metatarsalgia    Metatarsalgia is pain in the \"ball of your foot,\" the area between your arch and your toes. The pain usually starts in one or more of the five bones in this area under the toes. These bones are called the metatarsals.  Often, a

## 2021-10-19 ENCOUNTER — OFFICE VISIT (OUTPATIENT)
Dept: PODIATRY CLINIC | Facility: CLINIC | Age: 36
End: 2021-10-19

## 2021-10-19 ENCOUNTER — PROCEDURE VISIT (OUTPATIENT)
Dept: PHYSICAL MEDICINE AND REHAB | Facility: CLINIC | Age: 36
End: 2021-10-19

## 2021-10-19 ENCOUNTER — HOSPITAL ENCOUNTER (OUTPATIENT)
Dept: GENERAL RADIOLOGY | Facility: HOSPITAL | Age: 36
Discharge: HOME | End: 2021-10-19
Attending: PODIATRIST
Payer: COMMERCIAL

## 2021-10-19 DIAGNOSIS — D36.10 NEUROMA: ICD-10-CM

## 2021-10-19 DIAGNOSIS — M54.42 CHRONIC BILATERAL LOW BACK PAIN WITH BILATERAL SCIATICA: Primary | ICD-10-CM

## 2021-10-19 DIAGNOSIS — M77.41 METATARSALGIA OF BOTH FEET: ICD-10-CM

## 2021-10-19 DIAGNOSIS — M79.641 BILATERAL HAND PAIN: ICD-10-CM

## 2021-10-19 DIAGNOSIS — L60.3 NAIL DYSTROPHY: ICD-10-CM

## 2021-10-19 DIAGNOSIS — M54.41 CHRONIC BILATERAL LOW BACK PAIN WITH BILATERAL SCIATICA: Primary | ICD-10-CM

## 2021-10-19 DIAGNOSIS — G89.29 CHRONIC BILATERAL LOW BACK PAIN WITH BILATERAL SCIATICA: Primary | ICD-10-CM

## 2021-10-19 DIAGNOSIS — M77.42 METATARSALGIA OF BOTH FEET: Primary | ICD-10-CM

## 2021-10-19 DIAGNOSIS — M77.42 METATARSALGIA OF BOTH FEET: ICD-10-CM

## 2021-10-19 DIAGNOSIS — M77.41 METATARSALGIA OF BOTH FEET: Primary | ICD-10-CM

## 2021-10-19 DIAGNOSIS — M79.642 BILATERAL HAND PAIN: ICD-10-CM

## 2021-10-19 PROCEDURE — 95886 MUSC TEST DONE W/N TEST COMP: CPT | Performed by: PHYSICAL MEDICINE & REHABILITATION

## 2021-10-19 PROCEDURE — 73630 X-RAY EXAM OF FOOT: CPT | Performed by: PODIATRIST

## 2021-10-19 PROCEDURE — 95913 NRV CNDJ TEST 13/> STUDIES: CPT | Performed by: PHYSICAL MEDICINE & REHABILITATION

## 2021-10-19 PROCEDURE — 99243 OFF/OP CNSLTJ NEW/EST LOW 30: CPT | Performed by: PODIATRIST

## 2021-10-19 RX ORDER — METHYLPREDNISOLONE 4 MG/1
TABLET ORAL
Qty: 1 EACH | Refills: 0 | Status: SHIPPED | OUTPATIENT
Start: 2021-10-19 | End: 2021-11-10 | Stop reason: ALTCHOICE

## 2021-10-19 NOTE — PROGRESS NOTES
HPI:    Patient ID: Jonny Zarate is a 31year old female. This pleasant 22-year-old female presents as new patient to me on consult from [address] [employer]. Patient has primarily 1 concern and that is pain associated with the ball of both feet.   She states th her on a Medrol dose pack. I reviewed the use of the medication, concerns, and expectations. I plan to reevaluate in the next few weeks and will have an opportunity then to review the neurologic evaluation.   She is well-informed         ASSESSMENT/PLAN:

## 2021-10-20 NOTE — PROCEDURES
[address]  Phone: [phone]  Fax: [phone] REPORT          Patient: Alex Chung Hand Dominance:  right   Patient ID: [id number] Referring Dr: Muscle Latency Amplitude Amp % Duration Segments Distance Lat Diff Velocity     ms mV % ms  cm ms m/s   R Median - APB      Wrist APB 3.54 12.9 100 5.52 Wrist - APB 8        Elbow APB 7.19 11.2 86.6 5.36 Elbow - Wrist 20 3.65 55   L Median - APB      Wrist - Hand dorsum (Forearm)      Forearm Hand dorsum 1.93 2.45 25.3 29.5 Forearm - Hand dorsum 10 52   R Sural - Ankle (Calf)      Calf Ankle 2.97 3.80 10.4 16.0 Calf - Ankle 14 47   L Sural - Ankle (Calf)      Calf Ankle 3.33 4.11 21.4 23.7 Calf - Ankle 10 30

## 2021-11-08 ENCOUNTER — NURSE ONLY (OUTPATIENT)
Dept: LAB | Facility: HOSPITAL | Age: 36
End: 2021-11-08
Attending: FAMILY MEDICINE
Payer: COMMERCIAL

## 2021-11-08 ENCOUNTER — TELEPHONE (OUTPATIENT)
Dept: FAMILY MEDICINE CLINIC | Facility: CLINIC | Age: 36
End: 2021-11-08

## 2021-11-08 DIAGNOSIS — U07.1 COVID-19: ICD-10-CM

## 2021-11-08 DIAGNOSIS — U07.1 COVID-19: Primary | ICD-10-CM

## 2021-11-08 NOTE — TELEPHONE ENCOUNTER
*see pt message from 6 minutes ago*    Pt requesting COVID-19 test due to recent travel abroad. Pt left country on 10/31 - returned Saturday 11/6.

## 2021-11-15 NOTE — TELEPHONE ENCOUNTER
Called pt and c/o symptoms of COVID, n/v/d, difficulty with focus and fatigue, body aches/pain, chills. Denies fever, loss of taste/smell,  SOB    Advised do tylenol or motrin, Gatorade low sugar/pedialyte    Provided pt SDA 11/17/21 with Dr. Taryn Peña.     E

## 2021-11-15 NOTE — TELEPHONE ENCOUNTER
PT states she is returning missed call from last week. Pt states she was instructed to call back to get further instructions for Covid Positive diagnosis.  Pt states she is feeling very weak, has diarrhea and nauseous, also states she is having difficulty f

## 2021-11-17 ENCOUNTER — TELEMEDICINE (OUTPATIENT)
Dept: FAMILY MEDICINE CLINIC | Facility: CLINIC | Age: 36
End: 2021-11-17
Payer: COMMERCIAL

## 2021-11-17 DIAGNOSIS — U07.1 TELEHEALTH ENCOUNTER FOR CONFIRMED COVID-19: Primary | ICD-10-CM

## 2021-11-17 PROCEDURE — 99213 OFFICE O/P EST LOW 20 MIN: CPT | Performed by: FAMILY MEDICINE

## 2021-11-17 NOTE — PROGRESS NOTES
CC:  Patient presents with:  Covid: fatigue, low appetite, chills, nausea, and diarrhea      HPI: 31year old female presenting for video visit to discuss Covid-19 infection.   Traveled to [facility] from 10/31 through 11/6 and had a positive Covid-19 test on 1 Please advise Other Topics      Concerns:        Caffeine Concern: Not Asked        Exercise: Not Asked        Seat Belt: Not Asked        Special Diet: Not Asked        Stress Concern: Not Asked        Weight Concern: Not Asked    Social History Narrative      Not on f medical decision-making and tests are ordered as detailed in the plan of care above.       Hillary Parada DO  11/17/21  2:57 PM

## 2021-11-17 NOTE — PATIENT INSTRUCTIONS
Coronavirus Disease 2019 (COVID-19)     [facility] is committed to the safety and well-being of our patients, members, employees, and communities.  As concerns arise about the new strain of coronavirus that causes COVID-19, [facility] exposure  • After day 7 from date of last exposure with a negative test result (test must occur on day 5 or later)  After stopping quarantine, you should  • Watch for symptoms until 14 days after exposure.   • If you have symptoms, immediately self-isolate Care     If you are awaiting test results or are confirmed positive for COVID -19, and your symptoms worsen at home with symptoms such as: extreme weakness, difficult breathing, or unrelenting fevers greater than 100.4 degrees Fahrenheit, you should contac Follow-up  If you are diagnosed with COVID, refrain from exercise until approved by your primary care provider. Please call your primary care provider within 2 days of your discharge to arrange for a telehealth follow-up.  CDC does not recommend repeat test Control & Prevention (CDC)  10 things you can do to manage your health at home, Parag.nl. pdf  [email or web address].cy Retrieved March 17, 2021, from [email or web address]/covid-19-information/covid-19-long-haulers. html  Long-term effects of covid-19. (n.d.).  Retrieved May 11, 2021, from [email or web address]

## 2021-11-24 ENCOUNTER — OFFICE VISIT (OUTPATIENT)
Dept: PODIATRY CLINIC | Facility: CLINIC | Age: 36
End: 2021-11-24

## 2021-11-24 DIAGNOSIS — M77.42 METATARSALGIA OF BOTH FEET: Primary | ICD-10-CM

## 2021-11-24 DIAGNOSIS — M77.41 METATARSALGIA OF BOTH FEET: Primary | ICD-10-CM

## 2021-11-24 PROCEDURE — 99213 OFFICE O/P EST LOW 20 MIN: CPT | Performed by: PODIATRIST

## 2021-11-24 NOTE — PROGRESS NOTES
HPI:    Patient ID: Carisa Mays is a 31year old female. This 37-year-old female presents for follow-up in reference to pain the right foot symptoms have resolved completely the left continues to be a concern.   She states that the oral cortisone provid

## 2021-12-21 ENCOUNTER — TELEPHONE (OUTPATIENT)
Dept: OTOLARYNGOLOGY | Facility: CLINIC | Age: 36
End: 2021-12-21

## 2022-01-06 ENCOUNTER — TELEPHONE (OUTPATIENT)
Dept: PHYSICAL MEDICINE AND REHAB | Facility: CLINIC | Age: 37
End: 2022-01-06

## 2022-01-06 ENCOUNTER — TELEPHONE (OUTPATIENT)
Dept: OTOLARYNGOLOGY | Facility: CLINIC | Age: 37
End: 2022-01-06

## 2022-01-06 ENCOUNTER — OFFICE VISIT (OUTPATIENT)
Dept: OTOLARYNGOLOGY | Facility: CLINIC | Age: 37
End: 2022-01-06
Payer: COMMERCIAL

## 2022-01-06 VITALS — BODY MASS INDEX: 23.32 KG/M2 | WEIGHT: 140 LBS | HEIGHT: 65 IN

## 2022-01-06 DIAGNOSIS — J34.2 DEVIATED NASAL SEPTUM: Primary | ICD-10-CM

## 2022-01-06 PROCEDURE — 99214 OFFICE O/P EST MOD 30 MIN: CPT | Performed by: OTOLARYNGOLOGY

## 2022-01-06 PROCEDURE — 3008F BODY MASS INDEX DOCD: CPT | Performed by: OTOLARYNGOLOGY

## 2022-01-06 NOTE — PROGRESS NOTES
Mohamud Dale is a 31year old female. Patient presents with:   Follow - Up: pt here for follow up of deviated septum to discuss surgery, pe pt symptoms are horrible during the winter months      HISTORY OF PRESENT ILLNESS  Difficulty breathing from her n Male        Birth control/protection: Vasectomy      Family History   Problem Relation Age of Onset   • Lipids Mother    • Hypertension Mother    • Cancer Mother    • Lipids Maternal Grandmother    • Arthritis Maternal Grandmother        Past Medical Histo Inspection - Normal.        Lymph Detail Normal Submental. Submandibular. Anterior cervical. Posterior cervical. Supraclavicular. lungs  CTA.  NO RRW   Nose/Mouth/Throat Normal External nose - Normal. Lips/teeth/gums - Normal. Tonsils - Normal. Oropharynx

## 2022-01-06 NOTE — TELEPHONE ENCOUNTER
EMG 10/19/21. Note by Dr Christian Yanez normal test.     Left message on patient`s voice mail to return call to office.

## 2022-01-06 NOTE — TELEPHONE ENCOUNTER
Patient is scheduled for surgery on 1/19/22 at [facility] with Dr. Jaylen Gilmore.

## 2022-01-06 NOTE — TELEPHONE ENCOUNTER
pt had done a EMG on 10/19/21 , pt want to know her results . Please give her a call back with results .

## 2022-01-06 NOTE — TELEPHONE ENCOUNTER
Spoke to patient, relayed to her EMG normal per Dr Galvan`s note. NOV 3/24/22 Dr Vinicio Sullivan. LOV 10/19/21 Dr Vinicio Sullivan for EMG. LOV with Dr Leon Mckay 11/24/21. States oral prednisone did help.     Patient asking if Dr Vinicio Sullivan would write medrol dose pack Rx prior

## 2022-01-07 NOTE — TELEPHONE ENCOUNTER
S/W patient and she states she is still have foot pain and will be [place] of the country [place]. Rescheduled for 01/26/2021 @ 3pm in [place].

## 2022-01-07 NOTE — TELEPHONE ENCOUNTER
She should have a sooner appointment to better determine the reason for the steroids and why they helping.

## 2022-01-17 ENCOUNTER — TELEPHONE (OUTPATIENT)
Dept: FAMILY MEDICINE CLINIC | Facility: CLINIC | Age: 37
End: 2022-01-17

## 2022-01-17 NOTE — TELEPHONE ENCOUNTER
Would wait until 5 days after exposure to test if without symptoms and strongly encourage rapid at home test if she has access to one as PCR is not as reliable given previous history of Covid-19. Also to use extra caution until testing.

## 2022-01-17 NOTE — TELEPHONE ENCOUNTER
Spoke with Dr Nadege Chou d/t pt being COVID + on 11/08/2021. Pt should do antigen test at home d/t may be +COVID and unsure if from past exposure or recent. Called pt and provided DR. Eddy's instruction. Pt verbalized understanding.

## 2022-01-25 ENCOUNTER — TELEPHONE (OUTPATIENT)
Dept: OTOLARYNGOLOGY | Facility: CLINIC | Age: 37
End: 2022-01-25

## 2022-01-26 ENCOUNTER — OFFICE VISIT (OUTPATIENT)
Dept: PHYSICAL MEDICINE AND REHAB | Facility: CLINIC | Age: 37
End: 2022-01-26
Payer: COMMERCIAL

## 2022-01-26 VITALS
BODY MASS INDEX: 23.82 KG/M2 | SYSTOLIC BLOOD PRESSURE: 102 MMHG | HEART RATE: 73 BPM | OXYGEN SATURATION: 98 % | HEIGHT: 65 IN | WEIGHT: 143 LBS | DIASTOLIC BLOOD PRESSURE: 70 MMHG

## 2022-01-26 DIAGNOSIS — G89.29 CHRONIC BILATERAL LOW BACK PAIN WITH BILATERAL SCIATICA: ICD-10-CM

## 2022-01-26 DIAGNOSIS — M50.90 CERVICAL DISC DISEASE: ICD-10-CM

## 2022-01-26 DIAGNOSIS — M54.41 CHRONIC BILATERAL LOW BACK PAIN WITH BILATERAL SCIATICA: ICD-10-CM

## 2022-01-26 DIAGNOSIS — M54.42 CHRONIC BILATERAL LOW BACK PAIN WITH BILATERAL SCIATICA: ICD-10-CM

## 2022-01-26 DIAGNOSIS — M79.671 BILATERAL FOOT PAIN: ICD-10-CM

## 2022-01-26 DIAGNOSIS — M54.16 LUMBAR RADICULOPATHY: Primary | ICD-10-CM

## 2022-01-26 DIAGNOSIS — M50.20 CERVICAL HERNIATED DISC: ICD-10-CM

## 2022-01-26 DIAGNOSIS — M48.02 CERVICAL STENOSIS OF SPINE: ICD-10-CM

## 2022-01-26 DIAGNOSIS — F41.9 ANXIETY: ICD-10-CM

## 2022-01-26 DIAGNOSIS — M79.672 BILATERAL FOOT PAIN: ICD-10-CM

## 2022-01-26 PROCEDURE — 3078F DIAST BP <80 MM HG: CPT | Performed by: PHYSICAL MEDICINE & REHABILITATION

## 2022-01-26 PROCEDURE — 3074F SYST BP LT 130 MM HG: CPT | Performed by: PHYSICAL MEDICINE & REHABILITATION

## 2022-01-26 PROCEDURE — 99214 OFFICE O/P EST MOD 30 MIN: CPT | Performed by: PHYSICAL MEDICINE & REHABILITATION

## 2022-01-26 PROCEDURE — 3008F BODY MASS INDEX DOCD: CPT | Performed by: PHYSICAL MEDICINE & REHABILITATION

## 2022-01-26 NOTE — PROGRESS NOTES
Low Back Pain H & P    Chief Complaint: Patient presents with: Foot Pain: LOV 10/19/21 for EMG BUE. Pain to ball of foot, bilaterally. Worse w/standing and in the AM. Rates 7/10. Denies N/T or weakness. Takes ibuprofen PRN. Done w/PT.  Interested in [address] [facility] Spouse name: Not on file      Number of children: Not on file      Years of education: Not on file      Highest education level: Not on file    Occupational History      Not on file    Tobacco Use      Smoking status: Never Smoker      Smokeless tobacco difficulty      Lumbar Spine:    Scoliosis: No scoliosis present     Lumbar Spine Palpation:    Spinous Processes: Non-tender for all Spinous Processes   Z-joints: Non-tender for all Z-joints   SIJ: Non-tender for bilateral SIJ   Piriformis Muscle: Non-ten

## 2022-01-26 NOTE — PATIENT INSTRUCTIONS
Plan  She will get a MRI of the lumbar spine. She will call me once she has had the MRI and I will determine which type of lumbar BEV she will need to have. If she has the injections, then she might need more PT for stabilization.     She is thinkin

## 2022-02-03 ENCOUNTER — OFFICE VISIT (OUTPATIENT)
Dept: FAMILY MEDICINE CLINIC | Facility: CLINIC | Age: 37
End: 2022-02-03
Payer: COMMERCIAL

## 2022-02-03 VITALS
HEART RATE: 77 BPM | DIASTOLIC BLOOD PRESSURE: 70 MMHG | HEIGHT: 65 IN | SYSTOLIC BLOOD PRESSURE: 118 MMHG | OXYGEN SATURATION: 98 % | BODY MASS INDEX: 23.82 KG/M2 | WEIGHT: 143 LBS

## 2022-02-03 DIAGNOSIS — Z23 NEED FOR VACCINATION: ICD-10-CM

## 2022-02-03 DIAGNOSIS — J34.2 DEVIATED SEPTUM: ICD-10-CM

## 2022-02-03 DIAGNOSIS — M54.16 LUMBAR RADICULOPATHY: ICD-10-CM

## 2022-02-03 DIAGNOSIS — F33.1 MODERATE EPISODE OF RECURRENT MAJOR DEPRESSIVE DISORDER (HCC): ICD-10-CM

## 2022-02-03 DIAGNOSIS — Z00.00 ENCOUNTER FOR ROUTINE ADULT HEALTH EXAMINATION WITHOUT ABNORMAL FINDINGS: Primary | ICD-10-CM

## 2022-02-03 DIAGNOSIS — M50.20 CERVICAL HERNIATED DISC: ICD-10-CM

## 2022-02-03 DIAGNOSIS — F41.9 ANXIETY: ICD-10-CM

## 2022-02-03 PROCEDURE — 3008F BODY MASS INDEX DOCD: CPT | Performed by: FAMILY MEDICINE

## 2022-02-03 PROCEDURE — 99213 OFFICE O/P EST LOW 20 MIN: CPT | Performed by: FAMILY MEDICINE

## 2022-02-03 PROCEDURE — 3078F DIAST BP <80 MM HG: CPT | Performed by: FAMILY MEDICINE

## 2022-02-03 PROCEDURE — 90686 IIV4 VACC NO PRSV 0.5 ML IM: CPT | Performed by: FAMILY MEDICINE

## 2022-02-03 PROCEDURE — 3074F SYST BP LT 130 MM HG: CPT | Performed by: FAMILY MEDICINE

## 2022-02-03 PROCEDURE — 90471 IMMUNIZATION ADMIN: CPT | Performed by: FAMILY MEDICINE

## 2022-02-03 PROCEDURE — 99395 PREV VISIT EST AGE 18-39: CPT | Performed by: FAMILY MEDICINE

## 2022-02-03 RX ORDER — ESCITALOPRAM OXALATE 10 MG/1
TABLET ORAL
Qty: 90 TABLET | Refills: 0 | Status: SHIPPED | OUTPATIENT
Start: 2022-02-03 | End: 2022-03-03

## 2022-02-04 ENCOUNTER — PATIENT MESSAGE (OUTPATIENT)
Dept: OTOLARYNGOLOGY | Facility: CLINIC | Age: 37
End: 2022-02-04

## 2022-03-01 ENCOUNTER — PATIENT MESSAGE (OUTPATIENT)
Dept: OTOLARYNGOLOGY | Facility: CLINIC | Age: 37
End: 2022-03-01

## 2022-03-03 ENCOUNTER — PATIENT MESSAGE (OUTPATIENT)
Dept: PHYSICAL MEDICINE AND REHAB | Facility: CLINIC | Age: 37
End: 2022-03-03

## 2022-03-03 ENCOUNTER — TELEPHONE (OUTPATIENT)
Dept: OTOLARYNGOLOGY | Facility: CLINIC | Age: 37
End: 2022-03-03

## 2022-03-03 RX ORDER — ESCITALOPRAM OXALATE 10 MG/1
10 TABLET ORAL DAILY
Qty: 90 TABLET | Refills: 0 | Status: SHIPPED | OUTPATIENT
Start: 2022-03-03 | End: 2022-03-24

## 2022-03-03 NOTE — TELEPHONE ENCOUNTER
MRI order was placed on 1/26/22. Message forwarded on to referrals team for update on insurance authorization.

## 2022-03-03 NOTE — TELEPHONE ENCOUNTER
From: Mila aMthur  To: Kitty Yeh MD  Sent: 3/3/2022 9:22 AM CST  Subject: Mri    I was told by dr Silvia Nuñez that I would receive a call to schedule the mri once the insurance has approved it. Can I get an update about this? Where do I call to schedule the mri?

## 2022-03-03 NOTE — TELEPHONE ENCOUNTER
MRI SPINE LUMBAR (CPT=72148)-NO AUTH REQUIRED    Imaging requests for [facility] coverage and schedule at any of the [facility] locations is auto-approved. Request does not require a prior-authorization. Called patient,informed of above and  transferred to central scheduling for appointment.

## 2022-03-13 ENCOUNTER — LAB REQUISITION (OUTPATIENT)
Dept: SURGERY | Age: 37
End: 2022-03-13
Payer: COMMERCIAL

## 2022-03-14 LAB — SARS-COV-2 RNA RESP QL NAA+PROBE: NOT DETECTED

## 2022-03-16 ENCOUNTER — TELEPHONE (OUTPATIENT)
Dept: OTOLARYNGOLOGY | Facility: CLINIC | Age: 37
End: 2022-03-16

## 2022-03-16 RX ORDER — HYDROCODONE BITARTRATE AND ACETAMINOPHEN 7.5; 325 MG/1; MG/1
1 TABLET ORAL EVERY 6 HOURS PRN
Qty: 30 TABLET | Refills: 0 | Status: SHIPPED | OUTPATIENT
Start: 2022-03-16 | End: 2022-03-24

## 2022-03-16 RX ORDER — CEPHALEXIN 500 MG/1
500 CAPSULE ORAL EVERY 8 HOURS
Qty: 21 CAPSULE | Refills: 0 | Status: SHIPPED | OUTPATIENT
Start: 2022-03-16 | End: 2022-03-24

## 2022-03-17 ENCOUNTER — TELEPHONE (OUTPATIENT)
Dept: OTOLARYNGOLOGY | Facility: CLINIC | Age: 37
End: 2022-03-17

## 2022-03-17 NOTE — TELEPHONE ENCOUNTER
Pt is post op day 1  septoplasty, SMR. Per  Pt pt is doing well no bleeding,no fever, advised pt no bending or heavy lifting for the next week and pt is not to blow nose until seen by Dr Shilo Grover. Advised pt she can start using OTC saline nasal 2 spray spray daily, afrin up to 5 days post op. Reviewed post op medications. Advised pt to contact our office if any increased bleeding  (saturating more than 4 mustache dressings within the hour) or fever greater than 102. Pt verbalized understanding.

## 2022-03-24 ENCOUNTER — OFFICE VISIT (OUTPATIENT)
Dept: OTOLARYNGOLOGY | Facility: CLINIC | Age: 37
End: 2022-03-24
Payer: COMMERCIAL

## 2022-03-24 VITALS — TEMPERATURE: 99 F | BODY MASS INDEX: 23.32 KG/M2 | WEIGHT: 140 LBS | HEIGHT: 65 IN

## 2022-03-24 DIAGNOSIS — R09.81 NASAL CONGESTION: ICD-10-CM

## 2022-03-24 DIAGNOSIS — J34.2 DEVIATED NASAL SEPTUM: Primary | ICD-10-CM

## 2022-03-24 PROCEDURE — 99024 POSTOP FOLLOW-UP VISIT: CPT | Performed by: OTOLARYNGOLOGY

## 2022-03-24 PROCEDURE — 3008F BODY MASS INDEX DOCD: CPT | Performed by: OTOLARYNGOLOGY

## 2022-03-31 ENCOUNTER — HOSPITAL ENCOUNTER (OUTPATIENT)
Dept: MRI IMAGING | Facility: HOSPITAL | Age: 37
Discharge: HOME | End: 2022-03-31
Attending: PHYSICAL MEDICINE & REHABILITATION
Payer: COMMERCIAL

## 2022-03-31 DIAGNOSIS — G89.29 CHRONIC BILATERAL LOW BACK PAIN WITH BILATERAL SCIATICA: ICD-10-CM

## 2022-03-31 DIAGNOSIS — M54.42 CHRONIC BILATERAL LOW BACK PAIN WITH BILATERAL SCIATICA: ICD-10-CM

## 2022-03-31 DIAGNOSIS — M54.41 CHRONIC BILATERAL LOW BACK PAIN WITH BILATERAL SCIATICA: ICD-10-CM

## 2022-03-31 DIAGNOSIS — M54.16 LUMBAR RADICULOPATHY: ICD-10-CM

## 2022-03-31 PROCEDURE — 72148 MRI LUMBAR SPINE W/O DYE: CPT | Performed by: PHYSICAL MEDICINE & REHABILITATION

## 2022-04-06 ENCOUNTER — TELEPHONE (OUTPATIENT)
Dept: PHYSICAL MEDICINE AND REHAB | Facility: CLINIC | Age: 37
End: 2022-04-06

## 2022-04-06 NOTE — TELEPHONE ENCOUNTER
Called patient to find out why she missed her appointment. She believed her appointment was at 6:00pm. I told her we do not see patient at that time. Per Dr Marysol Jeffries, reschedule her appointment.

## 2022-04-16 ENCOUNTER — PATIENT MESSAGE (OUTPATIENT)
Dept: OTOLARYNGOLOGY | Facility: CLINIC | Age: 37
End: 2022-04-16

## 2022-04-16 NOTE — TELEPHONE ENCOUNTER
Per pt no more bleeding, per , bleeding may be from surgery, may be from turbinates. Pt to apply Vaseline on the outer edge of nose, no bending or heavy lifting ,no blowing nose . Pt advised if bleeding restarts. Pt verbalized understanding.

## 2022-05-04 ENCOUNTER — OFFICE VISIT (OUTPATIENT)
Dept: PHYSICAL MEDICINE AND REHAB | Facility: CLINIC | Age: 37
End: 2022-05-04
Payer: COMMERCIAL

## 2022-05-04 ENCOUNTER — PATIENT MESSAGE (OUTPATIENT)
Dept: FAMILY MEDICINE CLINIC | Facility: CLINIC | Age: 37
End: 2022-05-04

## 2022-05-04 ENCOUNTER — TELEPHONE (OUTPATIENT)
Dept: PHYSICAL MEDICINE AND REHAB | Facility: CLINIC | Age: 37
End: 2022-05-04

## 2022-05-04 VITALS
DIASTOLIC BLOOD PRESSURE: 72 MMHG | WEIGHT: 140.13 LBS | HEART RATE: 75 BPM | BODY MASS INDEX: 23.35 KG/M2 | HEIGHT: 65 IN | SYSTOLIC BLOOD PRESSURE: 120 MMHG | OXYGEN SATURATION: 97 %

## 2022-05-04 DIAGNOSIS — F41.9 ANXIETY: ICD-10-CM

## 2022-05-04 DIAGNOSIS — M54.41 CHRONIC BILATERAL LOW BACK PAIN WITH BILATERAL SCIATICA: ICD-10-CM

## 2022-05-04 DIAGNOSIS — M54.42 CHRONIC BILATERAL LOW BACK PAIN WITH BILATERAL SCIATICA: ICD-10-CM

## 2022-05-04 DIAGNOSIS — M51.9 LUMBAR DISC DISEASE: ICD-10-CM

## 2022-05-04 DIAGNOSIS — M54.16 LUMBAR RADICULOPATHY: Primary | ICD-10-CM

## 2022-05-04 DIAGNOSIS — G89.29 CHRONIC BILATERAL LOW BACK PAIN WITH BILATERAL SCIATICA: ICD-10-CM

## 2022-05-04 DIAGNOSIS — M51.26 LUMBAR HERNIATED DISC: ICD-10-CM

## 2022-05-04 PROCEDURE — 3074F SYST BP LT 130 MM HG: CPT | Performed by: PHYSICAL MEDICINE & REHABILITATION

## 2022-05-04 PROCEDURE — 99214 OFFICE O/P EST MOD 30 MIN: CPT | Performed by: PHYSICAL MEDICINE & REHABILITATION

## 2022-05-04 PROCEDURE — 3078F DIAST BP <80 MM HG: CPT | Performed by: PHYSICAL MEDICINE & REHABILITATION

## 2022-05-04 PROCEDURE — 3008F BODY MASS INDEX DOCD: CPT | Performed by: PHYSICAL MEDICINE & REHABILITATION

## 2022-05-04 NOTE — PATIENT INSTRUCTIONS
Plan  She will get into the PT for the lumbar spine. I will hold on doing bilateral L5 TFESI's for now. The patient does not need any pain medications at this time. She will follow up in 2-3 months or sooner if needed.

## 2022-05-04 NOTE — TELEPHONE ENCOUNTER
Submitted request to [facility] for Physical Therapy CPT [id number]     Status: pending [facility] clinical review

## 2022-05-06 ENCOUNTER — OFFICE VISIT (OUTPATIENT)
Dept: OTOLARYNGOLOGY | Facility: CLINIC | Age: 37
End: 2022-05-06
Payer: COMMERCIAL

## 2022-05-06 VITALS — HEIGHT: 65 IN | BODY MASS INDEX: 23.32 KG/M2 | WEIGHT: 140 LBS

## 2022-05-06 DIAGNOSIS — H92.01 RIGHT EAR PAIN: Primary | ICD-10-CM

## 2022-05-06 PROCEDURE — 3008F BODY MASS INDEX DOCD: CPT | Performed by: OTOLARYNGOLOGY

## 2022-05-06 PROCEDURE — 99213 OFFICE O/P EST LOW 20 MIN: CPT | Performed by: OTOLARYNGOLOGY

## 2022-05-06 RX ORDER — CELECOXIB 200 MG/1
200 CAPSULE ORAL DAILY PRN
Qty: 30 CAPSULE | Refills: 0 | Status: SHIPPED | OUTPATIENT
Start: 2022-05-06 | End: 2022-06-05

## 2022-05-06 RX ORDER — CYCLOBENZAPRINE HCL 5 MG
5 TABLET ORAL NIGHTLY
Qty: 30 TABLET | Refills: 1 | Status: SHIPPED | OUTPATIENT
Start: 2022-05-06

## 2022-05-14 ENCOUNTER — TELEPHONE (OUTPATIENT)
Dept: PHYSICAL MEDICINE AND REHAB | Facility: CLINIC | Age: 37
End: 2022-05-14

## 2022-05-14 RX ORDER — METHYLPREDNISOLONE 4 MG/1
TABLET ORAL
Qty: 1 EACH | Refills: 0 | Status: SHIPPED | OUTPATIENT
Start: 2022-05-14
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
## 2022-05-16 NOTE — TELEPHONE ENCOUNTER
Spoke with patient who stated she is starting to feel better today. Instructed patient to call the office back with another update once she finishes the steroid. Patient understood and was thankful for the call.

## 2022-05-29 RX ORDER — ESCITALOPRAM OXALATE 10 MG/1
10 TABLET ORAL DAILY
Qty: 90 TABLET | Refills: 1 | Status: SHIPPED | OUTPATIENT
Start: 2022-05-29

## 2022-05-29 NOTE — TELEPHONE ENCOUNTER
Refill passed per [facility] protocol   Requested Prescriptions   Pending Prescriptions Disp Refills    ESCITALOPRAM 10 MG Oral Tab [Pharmacy Med Name: ESCITALOPRAM 10MG TABLETS] 90 tablet 0     Sig: TAKE 1 TABLET(10 MG) BY MOUTH DAILY        Psychiatric Non-Scheduled (Anti-Anxiety) Passed - 5/29/2022 11:03 AM        Passed - Appointment in last 6 or next 3 months

## 2022-11-22 ENCOUNTER — TELEPHONE (OUTPATIENT)
Dept: NEUROLOGY | Facility: CLINIC | Age: 37
End: 2022-11-22

## 2022-11-22 ENCOUNTER — PATIENT MESSAGE (OUTPATIENT)
Dept: PHYSICAL MEDICINE AND REHAB | Facility: CLINIC | Age: 37
End: 2022-11-22

## 2022-11-22 RX ORDER — METHYLPREDNISOLONE 4 MG/1
TABLET ORAL
Qty: 1 EACH | Refills: 0 | Status: SHIPPED | OUTPATIENT
Start: 2022-11-22

## 2022-11-22 NOTE — TELEPHONE ENCOUNTER
From: Michele Negrete  To: Sharee Agrawal MD  Sent: 11/22/2022 8:08 AM CST  Subject: Chest pain    Hello    I was able to get in contact with the on call doctor to prescribe me medication for my back pain. However, after informing of the pressure I feel on my chest the doctor expressed concern and wanted me to bring this up to Dr Con Agrawal. My insurance will be running out at the end of the month and I will not have insurance till next year January.

## 2022-11-22 NOTE — TELEPHONE ENCOUNTER
Patient at 7:30AM called stating she is having neck and left anterior chest pain. She states this is due to herniated discs in her neck and gets this whenever the weather changes. Neck pain is chronic. Last saw Dr. Katina Villalobos in May. States a Medrol dose pack always helps. Has never been to ER for this. 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 No pain into the arm. Looking at chart she has foot, wrist, thigh, lumbar pain, anxiety and depression. On medical marijuana. She called Dr. Leatha Saab after hours for similar sx in May 2022, got a Medrol dosepack. Never discussed the presence of chest pain with any other physician. I refilled the Medrol pack but asked her to present to her ER for evaluation of these symptoms, as she works there. Cardiac? She also should have a follow up with Dr. Katina Villalobos to discuss these symptoms if they are deemed to be MSK by the ER. Pls contact patient for a follow up.

## 2022-11-23 NOTE — TELEPHONE ENCOUNTER
If she is having the pain and needs to be seen right away, she can come in this afternoon for the last appointment of the day.

## 2022-11-23 NOTE — TELEPHONE ENCOUNTER
Patient declined afternoon appointment for 11/23/2022 at 3:30PM states she will not be out of work by that time. This RN also offered her 11/30/2022 at 11:00AM which patient also declined. States she would rather keep her 12/20/22 appointment with Dr. Ciaran Washington to determine what to do from there. This RN educated her that if she is still experiencing chest pain/symptoms she should go to the ER to rule out any cardiac issues. Patient verbalized understanding but insisted she would keep her 12/20/22 appt. Instructed patient to callback with any other questions/concerns.

## 2023-01-12 ENCOUNTER — OFFICE VISIT (OUTPATIENT)
Dept: PHYSICAL MEDICINE AND REHAB | Facility: CLINIC | Age: 38
End: 2023-01-12
Payer: COMMERCIAL

## 2023-01-12 ENCOUNTER — HOSPITAL ENCOUNTER (OUTPATIENT)
Dept: GENERAL RADIOLOGY | Facility: HOSPITAL | Age: 38
Discharge: HOME | End: 2023-01-12
Attending: PHYSICAL MEDICINE & REHABILITATION
Payer: COMMERCIAL

## 2023-01-12 DIAGNOSIS — M50.20 CERVICAL HERNIATED DISC: ICD-10-CM

## 2023-01-12 DIAGNOSIS — F41.9 ANXIETY: ICD-10-CM

## 2023-01-12 DIAGNOSIS — M50.90 CERVICAL DISC DISEASE: ICD-10-CM

## 2023-01-12 DIAGNOSIS — M25.551 RIGHT HIP PAIN: ICD-10-CM

## 2023-01-12 DIAGNOSIS — R07.89 CHEST WALL PAIN: ICD-10-CM

## 2023-01-12 DIAGNOSIS — M48.02 CERVICAL STENOSIS OF SPINE: ICD-10-CM

## 2023-01-12 DIAGNOSIS — M54.16 LUMBAR RADICULOPATHY: ICD-10-CM

## 2023-01-12 DIAGNOSIS — M25.551 RIGHT HIP PAIN: Primary | ICD-10-CM

## 2023-01-12 DIAGNOSIS — F33.1 MODERATE EPISODE OF RECURRENT MAJOR DEPRESSIVE DISORDER (HCC): ICD-10-CM

## 2023-01-12 DIAGNOSIS — M51.26 LUMBAR HERNIATED DISC: ICD-10-CM

## 2023-01-12 DIAGNOSIS — M51.9 LUMBAR DISC DISEASE: ICD-10-CM

## 2023-01-12 PROCEDURE — 73502 X-RAY EXAM HIP UNI 2-3 VIEWS: CPT | Performed by: PHYSICAL MEDICINE & REHABILITATION

## 2023-01-12 PROCEDURE — 99214 OFFICE O/P EST MOD 30 MIN: CPT | Performed by: PHYSICAL MEDICINE & REHABILITATION

## 2023-01-13 NOTE — PATIENT INSTRUCTIONS
Plan  She will get into the PT for the right hip and the lumbar spine. She will get a AP x-ray of the pelvis. The patient does not need any pain medications at this time. She will follow up in 2-3 months or sooner if needed.

## 2023-02-10 ENCOUNTER — TELEPHONE (OUTPATIENT)
Dept: PHYSICAL MEDICINE AND REHAB | Facility: CLINIC | Age: 38
End: 2023-02-10

## 2023-02-25 ENCOUNTER — OFFICE VISIT (OUTPATIENT)
Facility: CLINIC | Age: 38
End: 2023-02-25
Payer: COMMERCIAL

## 2023-02-25 VITALS
HEART RATE: 78 BPM | OXYGEN SATURATION: 98 % | WEIGHT: 140 LBS | DIASTOLIC BLOOD PRESSURE: 72 MMHG | HEIGHT: 65 IN | BODY MASS INDEX: 23.32 KG/M2 | SYSTOLIC BLOOD PRESSURE: 106 MMHG

## 2023-02-25 DIAGNOSIS — F33.1 MODERATE EPISODE OF RECURRENT MAJOR DEPRESSIVE DISORDER (HCC): ICD-10-CM

## 2023-02-25 DIAGNOSIS — F41.9 ANXIETY: ICD-10-CM

## 2023-02-25 DIAGNOSIS — M50.20 CERVICAL HERNIATED DISC: ICD-10-CM

## 2023-02-25 DIAGNOSIS — M48.02 CERVICAL STENOSIS OF SPINE: ICD-10-CM

## 2023-02-25 DIAGNOSIS — G47.00 INSOMNIA, UNSPECIFIED TYPE: ICD-10-CM

## 2023-02-25 DIAGNOSIS — M51.26 LUMBAR HERNIATED DISC: ICD-10-CM

## 2023-02-25 DIAGNOSIS — Z00.00 ENCOUNTER FOR ROUTINE ADULT HEALTH EXAMINATION WITHOUT ABNORMAL FINDINGS: Primary | ICD-10-CM

## 2023-02-25 PROCEDURE — 99395 PREV VISIT EST AGE 18-39: CPT | Performed by: FAMILY MEDICINE

## 2023-02-25 PROCEDURE — 3008F BODY MASS INDEX DOCD: CPT | Performed by: FAMILY MEDICINE

## 2023-02-25 PROCEDURE — 3078F DIAST BP <80 MM HG: CPT | Performed by: FAMILY MEDICINE

## 2023-02-25 PROCEDURE — 99213 OFFICE O/P EST LOW 20 MIN: CPT | Performed by: FAMILY MEDICINE

## 2023-02-25 PROCEDURE — 3074F SYST BP LT 130 MM HG: CPT | Performed by: FAMILY MEDICINE

## 2023-02-25 RX ORDER — TRAZODONE HYDROCHLORIDE 50 MG/1
50 TABLET ORAL NIGHTLY
Qty: 30 TABLET | Refills: 0 | Status: SHIPPED | OUTPATIENT
Start: 2023-02-25

## 2023-04-11 ENCOUNTER — PATIENT MESSAGE (OUTPATIENT)
Dept: PHYSICAL MEDICINE AND REHAB | Facility: CLINIC | Age: 38
End: 2023-04-11

## 2023-04-12 ENCOUNTER — TELEPHONE (OUTPATIENT)
Dept: PHYSICAL MEDICINE AND REHAB | Facility: CLINIC | Age: 38
End: 2023-04-12

## 2023-04-12 DIAGNOSIS — M54.2 NECK PAIN: Primary | ICD-10-CM

## 2023-04-12 RX ORDER — METHYLPREDNISOLONE 4 MG/1
TABLET ORAL
Qty: 1 EACH | Refills: 0 | Status: SHIPPED | OUTPATIENT
Start: 2023-04-12

## 2023-04-12 NOTE — TELEPHONE ENCOUNTER
Patient paged regarding neck pain radiating into the right shoulder. She states this occurs twice a year and she responds well to Medrol Dosepak. Patient is requesting Medrol Dosepak medication again. She denies any further radiating symptoms in the arm, any numbness tingling, any weakness, any changes in strength sensation or bowel bladder. She denies any fevers or chills. Advised patient that she should follow-up with Dr. Brando Yeh if the pain persist after completing the Medrol Dosepak. Patient agreed and was appreciative.     Rosa Quintanilla DO, FAAPMR & CAQSM  Physical Medicine and Rehabilitation/Sports Medicine  [facility]

## 2023-09-27 ENCOUNTER — TELEPHONE (OUTPATIENT)
Dept: PHYSICAL MEDICINE AND REHAB | Facility: CLINIC | Age: 38
End: 2023-09-27

## 2023-09-27 RX ORDER — CYCLOBENZAPRINE HCL 10 MG
10 TABLET ORAL 3 TIMES DAILY PRN
Qty: 30 TABLET | Refills: 0 | Status: SHIPPED | OUTPATIENT
Start: 2023-09-27

## 2023-09-27 NOTE — TELEPHONE ENCOUNTER
Per Jory linn for Cyclobenzaprine 10 mg TID prn #30 and patient needs a f/u appt as well. Spoke with patient and reviewed the above. Patient stated she does not have her schedule with her, so she will need to call back to set up the follow up appt.

## 2023-09-27 NOTE — TELEPHONE ENCOUNTER
Spoke with patient who stated she started experiencing a muscle spasm to her upper back. States it was across to her shoulders, but is now also radiating up her neck. States it is a tightness pain. She has tried Ibuprofen, heat and ice, but none of these are helping. Patient asking for flexeril. LOV: 1/12/3  NOV: none scheduled    Informed patient will review this with Neel Vergara and will call her back with recommendations. Patient understood and was appreciative.

## 2024-02-15 ENCOUNTER — OFFICE VISIT (OUTPATIENT)
Facility: CLINIC | Age: 39
End: 2024-02-15
Payer: COMMERCIAL

## 2024-02-15 ENCOUNTER — NURSE TRIAGE (OUTPATIENT)
Facility: CLINIC | Age: 39
End: 2024-02-15

## 2024-02-15 ENCOUNTER — LAB ENCOUNTER (OUTPATIENT)
Dept: LAB | Facility: HOSPITAL | Age: 39
End: 2024-02-15
Attending: FAMILY MEDICINE
Payer: COMMERCIAL

## 2024-02-15 VITALS
DIASTOLIC BLOOD PRESSURE: 60 MMHG | OXYGEN SATURATION: 98 % | WEIGHT: 143 LBS | HEIGHT: 65 IN | SYSTOLIC BLOOD PRESSURE: 104 MMHG | HEART RATE: 58 BPM | BODY MASS INDEX: 23.82 KG/M2

## 2024-02-15 DIAGNOSIS — R10.32 LEFT LOWER QUADRANT PAIN: Primary | ICD-10-CM

## 2024-02-15 DIAGNOSIS — R23.3 EASY BRUISABILITY: ICD-10-CM

## 2024-02-15 LAB
ALBUMIN SERPL-MCNC: 4.3 G/DL (ref 3.2–4.8)
ALBUMIN/GLOB SERPL: 1.3 {RATIO} (ref 1–2)
ALP LIVER SERPL-CCNC: 47 U/L
ALT SERPL-CCNC: 10 U/L
ANION GAP SERPL CALC-SCNC: 3 MMOL/L (ref 0–18)
APTT PPP: 28.7 SECONDS (ref 23.3–35.6)
AST SERPL-CCNC: 17 U/L (ref ?–34)
BASOPHILS # BLD AUTO: 0.03 X10(3) UL (ref 0–0.2)
BASOPHILS NFR BLD AUTO: 0.5 %
BILIRUB SERPL-MCNC: 0.4 MG/DL (ref 0.3–1.2)
BUN BLD-MCNC: 9 MG/DL (ref 9–23)
BUN/CREAT SERPL: 11 (ref 10–20)
CALCIUM BLD-MCNC: 9.5 MG/DL (ref 8.7–10.4)
CHLORIDE SERPL-SCNC: 109 MMOL/L (ref 98–112)
CO2 SERPL-SCNC: 27 MMOL/L (ref 21–32)
CREAT BLD-MCNC: 0.82 MG/DL
DEPRECATED RDW RBC AUTO: 39.4 FL (ref 35.1–46.3)
EGFRCR SERPLBLD CKD-EPI 2021: 94 ML/MIN/1.73M2 (ref 60–?)
EOSINOPHIL # BLD AUTO: 0.13 X10(3) UL (ref 0–0.7)
EOSINOPHIL NFR BLD AUTO: 2.3 %
ERYTHROCYTE [DISTWIDTH] IN BLOOD BY AUTOMATED COUNT: 11.7 % (ref 11–15)
FASTING STATUS PATIENT QL REPORTED: NO
GLOBULIN PLAS-MCNC: 3.3 G/DL (ref 2.8–4.4)
GLUCOSE BLD-MCNC: 73 MG/DL (ref 70–99)
HCT VFR BLD AUTO: 38.6 %
HGB BLD-MCNC: 12.8 G/DL
IMM GRANULOCYTES # BLD AUTO: 0.01 X10(3) UL (ref 0–1)
IMM GRANULOCYTES NFR BLD: 0.2 %
INR BLD: 0.97 (ref 0.8–1.2)
LYMPHOCYTES # BLD AUTO: 1.71 X10(3) UL (ref 1–4)
LYMPHOCYTES NFR BLD AUTO: 30.1 %
MCH RBC QN AUTO: 30.3 PG (ref 26–34)
MCHC RBC AUTO-ENTMCNC: 33.2 G/DL (ref 31–37)
MCV RBC AUTO: 91.5 FL
MONOCYTES # BLD AUTO: 0.41 X10(3) UL (ref 0.1–1)
MONOCYTES NFR BLD AUTO: 7.2 %
NEUTROPHILS # BLD AUTO: 3.39 X10 (3) UL (ref 1.5–7.7)
NEUTROPHILS # BLD AUTO: 3.39 X10(3) UL (ref 1.5–7.7)
NEUTROPHILS NFR BLD AUTO: 59.7 %
OSMOLALITY SERPL CALC.SUM OF ELEC: 285 MOSM/KG (ref 275–295)
PLATELET # BLD AUTO: 275 10(3)UL (ref 150–450)
POTASSIUM SERPL-SCNC: 3.7 MMOL/L (ref 3.5–5.1)
PROT SERPL-MCNC: 7.6 G/DL (ref 5.7–8.2)
PROTHROMBIN TIME: 13.5 SECONDS (ref 11.6–14.8)
RBC # BLD AUTO: 4.22 X10(6)UL
SODIUM SERPL-SCNC: 139 MMOL/L (ref 136–145)
WBC # BLD AUTO: 5.7 X10(3) UL (ref 4–11)

## 2024-02-15 PROCEDURE — 85025 COMPLETE CBC W/AUTO DIFF WBC: CPT

## 2024-02-15 PROCEDURE — 85730 THROMBOPLASTIN TIME PARTIAL: CPT

## 2024-02-15 PROCEDURE — 85610 PROTHROMBIN TIME: CPT

## 2024-02-15 PROCEDURE — 3008F BODY MASS INDEX DOCD: CPT | Performed by: FAMILY MEDICINE

## 2024-02-15 PROCEDURE — 36415 COLL VENOUS BLD VENIPUNCTURE: CPT

## 2024-02-15 PROCEDURE — 80053 COMPREHEN METABOLIC PANEL: CPT

## 2024-02-15 PROCEDURE — 99214 OFFICE O/P EST MOD 30 MIN: CPT | Performed by: FAMILY MEDICINE

## 2024-02-15 PROCEDURE — 3074F SYST BP LT 130 MM HG: CPT | Performed by: FAMILY MEDICINE

## 2024-02-15 PROCEDURE — 3078F DIAST BP <80 MM HG: CPT | Performed by: FAMILY MEDICINE

## 2024-02-15 NOTE — TELEPHONE ENCOUNTER
Action Requested: Summary for Provider     []  Critical Lab, Recommendations Needed  [] Need Additional Advice  []   FYI    []   Need Orders  [] Need Medications Sent to Pharmacy  []  Other     SUMMARY: Patient requesting an appt to be seen today.  Patient reports left side pelvic pain that started yesterday. Patient describes pain as a throbbing pain. States she took tylenol, but only helped subside the pain.   Denies: Vomiting, vaginal bleeding, diarrhea, fever    Appt made with available provider. Patient given care advice and also advised to got to IC/ER if symptoms worsen. Patient verbalized understanding.        Future Appointments   Date Time Provider Department Center   2/15/2024  3:30 PM Gagan Corley DO [facility]   3/26/2024 12:00 PM Gagan Corley DO [facility]       Reason for call: Pelvic Pain  Onset: 2/14/2024          Reason for Disposition   Constant pelvic pain lasting > 2 hours    Protocols used: Pelvic Pain - Female-[place]

## 2024-02-15 NOTE — PROGRESS NOTES
HPI:    Patient ID: Edna Guzman is a 39 year old female who presents for pelvic pain and bruising.    HPI  Pelvic pain:  Sx started yesterday.  Has left pelvic pain.  Started at random. Does not feel like it's a muscle.   Fairly constant.   No swelling or bulging in area.  Pain 6/10 at its worst.   No other associated symptoms.   No urinary, vaginal, or GI symptoms.   No identifiable triggers.   Has had before and usually resolves within one hour.   First day of LMP 2/8/24.   No current birth control. No concerns for STD.     Bruising:   Has easy bruising.   Has bruise on right thigh that has persisted. This is chronic.   Can have blood on gums when brushes her teeth.     Past Medical History:   Diagnosis Date    Allergic rhinitis     Anxiety     Arthritis     Cervical herniated disc     COVID-19 11/2021    Depression     Osteoarthritis     Postpartum depression         Current Outpatient Medications   Medication Sig Dispense Refill    traZODone 50 MG Oral Tab Take 1 tablet (50 mg total) by mouth nightly. 30 tablet 0        No Known Allergies    Review of Systems   Constitutional: Negative.    Respiratory: Negative.     Cardiovascular: Negative.    Gastrointestinal:  Positive for abdominal pain. Negative for anal bleeding, blood in stool, constipation, diarrhea, nausea and vomiting.   Genitourinary: Negative.    Neurological: Negative.    Hematological:  Bruises/bleeds easily.   All other systems reviewed and are negative.           /60   Pulse 58   Ht 5' 5\" (1.651 m)   Wt 143 lb (64.9 kg)   LMP 01/25/2023   SpO2 98%   BMI 23.80 kg/m²     PHYSICAL EXAM:   Physical Exam  Constitutional:       General: She is not in acute distress.     Appearance: Normal appearance. She is well-developed. She is not ill-appearing, toxic-appearing or diaphoretic.   HENT:      Head: Normocephalic and atraumatic.      Right Ear: External ear normal.      Left Ear: External ear normal.      Nose: Nose normal.       Mouth/Throat:      Mouth: Mucous membranes are moist.      Pharynx: Oropharynx is clear.   Eyes:      Extraocular Movements: Extraocular movements intact.      Conjunctiva/sclera: Conjunctivae normal.   Cardiovascular:      Rate and Rhythm: Normal rate and regular rhythm.      Pulses: Normal pulses.      Heart sounds: Normal heart sounds. No murmur heard.     No friction rub. No gallop.   Pulmonary:      Effort: Pulmonary effort is normal. No respiratory distress.      Breath sounds: Normal breath sounds. No wheezing or rales.   Abdominal:      General: Abdomen is flat. Bowel sounds are normal. There is no distension.      Palpations: Abdomen is soft. There is no mass.      Tenderness: There is abdominal tenderness (mild LLQ ttp). There is no guarding or rebound.      Hernia: No hernia is present.   Musculoskeletal:      Cervical back: Neck supple.      Right lower leg: No edema.      Left lower leg: No edema.   Lymphadenopathy:      Cervical: No cervical adenopathy.   Skin:     General: Skin is warm and dry.      Capillary Refill: Capillary refill takes less than 2 seconds.   Neurological:      General: No focal deficit present.      Mental Status: She is alert.   Psychiatric:         Mood and Affect: Mood normal.         Behavior: Behavior normal.         Thought Content: Thought content normal.         Judgment: Judgment normal.             ASSESSMENT/PLAN:     Encounter Diagnoses   Name Primary?    Left lower quadrant pain Yes    Easy bruisability        1. Left lower quadrant pain  - US PELVIS (TRANSABDOMINAL AND TRANSVAGINAL) (CPT=76856/44580); Future  -Unknown etiology. No concerning associated symptoms.  -Treat with NSAIDs and heating pad prn.   -Plan to schedule ultrasound if persists beyond the next couple days.     2. Easy bruisability  - CBC With Differential With Platelet; Future  - Comp Metabolic Panel (14); Future  - Prothrombin Time (PT); Future  - PTT, Activated; Future  -Will check labs.     Meds  This Visit:  Requested Prescriptions      No prescriptions requested or ordered in this encounter       Imaging & Referrals:  US PELVIS (TRANSABDOMINAL AND TRANSVAGINAL) (GSG=[id number])       Gagan Corley DO  ID#[id number]

## 2024-03-27 ENCOUNTER — TELEPHONE (OUTPATIENT)
Facility: CLINIC | Age: 39
End: 2024-03-27

## 2024-04-20 ENCOUNTER — HOSPITAL ENCOUNTER (OUTPATIENT)
Age: 39
Discharge: HOME | End: 2024-04-20
Payer: COMMERCIAL

## 2024-04-20 VITALS
TEMPERATURE: 98 F | DIASTOLIC BLOOD PRESSURE: 72 MMHG | HEART RATE: 78 BPM | SYSTOLIC BLOOD PRESSURE: 111 MMHG | OXYGEN SATURATION: 98 % | RESPIRATION RATE: 16 BRPM

## 2024-04-20 DIAGNOSIS — H02.841 EDEMA OF RIGHT UPPER EYELID: Primary | ICD-10-CM

## 2024-04-20 PROCEDURE — 99203 OFFICE O/P NEW LOW 30 MIN: CPT | Performed by: PHYSICIAN ASSISTANT

## 2024-04-20 RX ORDER — AMOXICILLIN AND CLAVULANATE POTASSIUM 875; 125 MG/1; MG/1
1 TABLET, FILM COATED ORAL 2 TIMES DAILY
Qty: 14 TABLET | Refills: 0 | Status: SHIPPED | OUTPATIENT
Start: 2024-04-20 | End: 2024-04-27

## 2024-04-20 NOTE — ED INITIAL ASSESSMENT (HPI)
Patient is here with complaints of swelling to her right upper eye lid.  She states it started two days ago.  She states it itches.

## 2024-04-20 NOTE — DISCHARGE INSTRUCTIONS
Take medications as prescribed. In addition, you can take an over the counter antihistamine (such as Zyrtec or Claritin) during the day and Benadryl before bed.    Seek immediate medical attention if any worsening swelling/pain, eye pain, visual symptoms, tightening in your throat, swelling in your lips/tongue/or throat, difficulty swallowing, difficulty breathing, shortness of breath, vomiting or ANY other concerns

## 2024-04-20 NOTE — ED PROVIDER NOTES
Patient Seen in: [facility]      History   No chief complaint on file.    Stated Complaint: Allergies - Worsening Swollen eye    Subjective:   HPI    Pt with pmh seasonal allergies with right upper eyelid swelling for the past 2 days. States area is mildly itchy and uncomfortable. No other associated symptoms.  No new contacts/exposures. No F/C/N/V, oral swelling, difficulty swallowing, difficulty breathing, eye pain, visual disturbances. Took OTC antihistamine with mild relief of symptoms.     Objective:   Past Medical History:    Allergic rhinitis    Anxiety    Arthritis    Cervical herniated disc    COVID-19    Depression    Osteoarthritis    Postpartum depression              Past Surgical History:   Procedure Laterality Date    Excision turbinate,submucous  2022    Lasik Bilateral 2022          Other surgical history      Repair of nasal septum  2022                Social History     Socioeconomic History    Marital status:    Tobacco Use    Smoking status: Never    Smokeless tobacco: Never   Vaping Use    Vaping status: Never Used   Substance and Sexual Activity    Alcohol use: Not Currently    Drug use: Never    Sexual activity: Yes     Partners: Male     Birth control/protection: Vasectomy   Other Topics Concern    Caffeine Concern No    Exercise No    Seat Belt No    Special Diet No    Stress Concern No    Weight Concern No              Review of Systems    Positive for stated complaint: Allergies - Worsening Swollen eye  Other systems are as noted in HPI.  Constitutional and vital signs reviewed.      All other systems reviewed and negative except as noted above.    Physical Exam     ED Triage Vitals [24 1022]   /72   Pulse 78   Resp 16   Temp 98.2 °F (36.8 °C)   Temp src Temporal   SpO2 98 %   O2 Device None (Room air)       Current:/72   Pulse 78   Temp 98.2 °F (36.8 °C) (Temporal)   Resp 16   LMP 2024 (Approximate)   SpO2 98%      Right Eye Chart Acuity: 20/40, Uncorrected  Left Eye Chart Acuity: 20/30, Uncorrected    Physical Exam  Vitals and nursing note reviewed.   Constitutional:       General: She is not in acute distress.     Appearance: Normal appearance. She is not ill-appearing or toxic-appearing.   HENT:      Head: Normocephalic.      Nose: Nose normal.      Mouth/Throat:      Mouth: Mucous membranes are moist.      Comments: No lip/tongue/sublingual swelling; airway patent   Eyes:      Conjunctiva/sclera: Conjunctivae normal.      Comments: Mild swelling, erythema to the right upper eyelid.  EOM intact without noted discomfort.  Right eye and conjunctival unremarkable.  No injection or drainage from the right eye. No appreciated stye.    Cardiovascular:      Rate and Rhythm: Normal rate.   Pulmonary:      Effort: Pulmonary effort is normal. No respiratory distress.   Musculoskeletal:         General: Normal range of motion.      Cervical back: Normal range of motion.   Skin:     General: Skin is warm.   Neurological:      General: No focal deficit present.      Mental Status: She is alert.   Psychiatric:         Mood and Affect: Mood normal.         Behavior: Behavior normal.             ED Course   Labs Reviewed - No data to display                   MDM                                      Medical Decision Making  39-year-old female with atraumatic edema to the right upper eyelid for several days.  Afebrile, vital signs are stable. Differential preseptal cellulitis versus contact dermatitis versus idiopathic edema.  No anaphylactic symptoms.  No concerns for orbital cellulitis. No eye pain or visual symptoms.   Advised supportive care with oral antihistamines to the area.  Symptom with Augmentin to cover for preseptal cellulitis.  Advised reevaluation in 2 days with primary care doctor to assess improvement.  May need oral steroids to cover for allergic reaction if symptoms are not improved. ER precautions advised.      Disposition and Plan     Clinical Impression:  1. Edema of right upper eyelid         Disposition:  Discharge  4/20/2024 10:53 am    Follow-up:  Gagan Corley DO  [address] [place] [id number]  [phone]    In 2 days      [facility] [address] [address]  [phone]              Medications Prescribed:  Discharge Medication List as of 4/20/2024 10:56 AM        START taking these medications    Details   amoxicillin clavulanate 875-125 MG Oral Tab Take 1 tablet by mouth 2 (two) times daily for 7 days., Normal, Disp-14 tablet, R-0

## 2024-04-21 ENCOUNTER — PATIENT MESSAGE (OUTPATIENT)
Facility: CLINIC | Age: 39
End: 2024-04-21

## 2024-04-21 ENCOUNTER — HOSPITAL ENCOUNTER (OUTPATIENT)
Age: 39
Discharge: HOME | End: 2024-04-21
Payer: COMMERCIAL

## 2024-04-21 VITALS
TEMPERATURE: 98 F | RESPIRATION RATE: 16 BRPM | DIASTOLIC BLOOD PRESSURE: 60 MMHG | SYSTOLIC BLOOD PRESSURE: 101 MMHG | OXYGEN SATURATION: 100 % | HEART RATE: 71 BPM

## 2024-04-21 DIAGNOSIS — H02.841 SWELLING OF RIGHT UPPER EYELID: Primary | ICD-10-CM

## 2024-04-21 DIAGNOSIS — H02.843 SWELLING OF EYELID, RIGHT: Primary | ICD-10-CM

## 2024-04-21 PROCEDURE — 99213 OFFICE O/P EST LOW 20 MIN: CPT | Performed by: PHYSICIAN ASSISTANT

## 2024-04-21 PROCEDURE — 96365 THER/PROPH/DIAG IV INF INIT: CPT | Performed by: PHYSICIAN ASSISTANT

## 2024-04-21 RX ORDER — AZITHROMYCIN 250 MG/1
TABLET, FILM COATED ORAL
Qty: 6 TABLET | Refills: 0 | Status: SHIPPED | OUTPATIENT
Start: 2024-04-21 | End: 2024-04-26

## 2024-04-21 NOTE — ED PROVIDER NOTES
Patient Seen in: [facility]      History     Chief Complaint   Patient presents with    Eye Problem     Stated Complaint: Eye Problem    Subjective:   HPI    Pt returns with worsening redness/swelling/tenderness/itching to the right upper eyelid. Pt has had 2 doses of her ABX. No F/C/N/V, eye pain, drainage from the area, oral swelling.     Objective:   Past Medical History:    Allergic rhinitis    Anxiety    Arthritis    Cervical herniated disc    COVID-19    Depression    Osteoarthritis    Postpartum depression              Past Surgical History:   Procedure Laterality Date    Excision turbinate,submucous  2022    Lasik Bilateral 2022          Other surgical history      Repair of nasal septum  2022                Social History     Socioeconomic History    Marital status:    Tobacco Use    Smoking status: Never    Smokeless tobacco: Never   Vaping Use    Vaping status: Never Used   Substance and Sexual Activity    Alcohol use: Not Currently    Drug use: Never    Sexual activity: Yes     Partners: Male     Birth control/protection: Vasectomy   Other Topics Concern    Caffeine Concern No    Exercise No    Seat Belt No    Special Diet No    Stress Concern No    Weight Concern No              Review of Systems    Positive for stated complaint: Eye Problem  Other systems are as noted in HPI.  Constitutional and vital signs reviewed.      All other systems reviewed and negative except as noted above.    Physical Exam     ED Triage Vitals [24 0819]   /60   Pulse 71   Resp 16   Temp 97.6 °F (36.4 °C)   Temp src Temporal   SpO2 100 %   O2 Device None (Room air)       Current:/60   Pulse 71   Temp 97.6 °F (36.4 °C) (Temporal)   Resp 16   LMP 2024 (Approximate)   SpO2 100%         Physical Exam  Vitals and nursing note reviewed.   Constitutional:       General: She is not in acute distress.     Appearance: Normal appearance. She is not ill-appearing or  toxic-appearing.   HENT:      Head: Normocephalic.      Nose: Nose normal.      Mouth/Throat:      Mouth: Mucous membranes are moist.   Eyes:      Conjunctiva/sclera: Conjunctivae normal.   Cardiovascular:      Rate and Rhythm: Normal rate.   Pulmonary:      Effort: Pulmonary effort is normal. No respiratory distress.   Musculoskeletal:         General: Normal range of motion.      Cervical back: Normal range of motion.   Skin:     General: Skin is warm.      Comments: Moderate edema, erythema to the right upper eyelid. Small area of induration along the eyelid border. No fluctuance. EOM intact without discomfort. No proptosis.     Neurological:      General: No focal deficit present.      Mental Status: She is alert.   Psychiatric:         Mood and Affect: Mood normal.         Behavior: Behavior normal.             ED Course   Labs Reviewed - No data to display                   MDM                                      Medical Decision Making  Pt returns with worsening right upper  eyelid swelling.   Differential preseptal cellulitis vs orbital cellulitis vs allergic reaction.   Afebrile, VSS. Nontoxic. No eye pain/visual symptoms.  Do not suspect orbital cellulitis.  No systemic sx. No anaphylactic symptoms. Discussed case with ophthalmologist Dr. Olivas - advised Unasyn in the clinic, discontinue Augmentin and start on azithromycin.  Warm soaks 2 times a day.  Follow-up in his clinic tomorrow for close reevaluation and further management as needed. Discussed case with Dr. Gregory -agreeable with management and disposition. Pt agreeable with plan. All questions answered.     Disposition and Plan     Clinical Impression:  1. Swelling of right upper eyelid         Disposition:  Discharge  4/21/2024  9:24 am    Follow-up:  Jono Olivas MD  [address]  [address]  [facility] [address]  [phone]      Opthalmology - Arrive at the clinic at 8:15AM    ER  Worsening pain, swelling, redness, eye pain, visual  changes, drainage from the eye, pain with eye movements, fevers or ANY other concerns              Medications Prescribed:  Discharge Medication List as of 4/21/2024  9:28 AM        START taking these medications    Details   azithromycin (ZITHROMAX Z-RAFITA) 250 MG Oral Tab 500 mg once followed by 250 mg daily x 4 days, Normal, Disp-6 tablet, R-0

## 2024-04-21 NOTE — DISCHARGE INSTRUCTIONS
Discontinue Augmentin and start azithromycin as directed.    Apply warm compress to the eye lid 2 times a day for the next several days.    You received 3 g of Unasyn in the clinic today.    Follow-up with Dr. Olivas, ophthalmology, tomorrow in the clinic at 8:15AM     Seek immediate medical attention if  Worsening pain, swelling, redness, eye pain, visual changes, drainage from the eye, pain with eye movements, fevers or ANY other concerns

## 2024-04-22 ENCOUNTER — TELEPHONE (OUTPATIENT)
Facility: CLINIC | Age: 39
End: 2024-04-22

## 2024-04-22 DIAGNOSIS — H02.841 SWELLING OF RIGHT UPPER EYELID: Primary | ICD-10-CM

## 2024-04-22 NOTE — TELEPHONE ENCOUNTER
RN called office of Dr. Prabhu Olivas, Ph [phone]  Spoke with Aaliyah.   Patient's date of birth and full name both confirmed.   RN informed of provider's message.   Fax [phone]    Faxed. Fax Confirmation received.

## 2024-04-22 NOTE — TELEPHONE ENCOUNTER
Pt states went to IC on Saturday and Sunday due to right eye swelling (see IC 4/20/24 and 4/21/24 encounters) and it is so severe she was told to follow up today with ophthalmology.  Per pt IC provider was able to get her an appointment this morning but pt needs referral.      Pended

## 2024-04-22 NOTE — TELEPHONE ENCOUNTER
From: Edna Guzman  To: Ting Eddy  Sent: 4/21/2024 8:51 AM CDT  Subject: Referral needed for 4/22    I have been in immediate care today and yesterday (should be in my chart) for my eye swelling and worsening. I will be going to see Dr Olivas (ophthalmology) tomorrow morning and need the referral in so there is no problems with insurance. I know this is last minute but I need it. Please call me with questions [phone]

## 2024-04-30 ENCOUNTER — TELEPHONE (OUTPATIENT)
Facility: CLINIC | Age: 39
End: 2024-04-30

## 2024-04-30 NOTE — TELEPHONE ENCOUNTER
Her post-procedure pain should be managed by the surgeon, thus I'd recommend she reach out to ophtho. Thank you.

## 2024-04-30 NOTE — TELEPHONE ENCOUNTER
Advised patient of Dr Corley's  note. Patient verbalized understanding and had no further questions.

## 2024-04-30 NOTE — TELEPHONE ENCOUNTER
Patient went to Dr. Jono Olivas, Ophthalmology and had a procedure completed for a recent eye infection. Per patient they actually had to cut her eyelid. They advised her to take Ibuprofen and to keep a patch on it for 24 hours and then can remove the patch. Patient is in pain and the Ibuprofen is not helping. She is requesting a stronger pain medication. She has taken Norco a long time ago and did well with it. Advised her provider may want to see her first but she stated, \"I hope they won't make me come in because I can't drive right now.\" No open appointments today.    Advised I will message provider and also to use ice on the eye as needed to reduce any swelling     Please advise, thanks    If sending a medication please use the [facility]    RN's call her at [phone]

## 2024-06-07 ENCOUNTER — TELEPHONE (OUTPATIENT)
Facility: CLINIC | Age: 39
End: 2024-06-07

## 2024-06-07 DIAGNOSIS — H00.11 CHALAZION RIGHT UPPER EYELID: Primary | ICD-10-CM

## 2024-09-13 NOTE — TELEPHONE ENCOUNTER
Patient stated that she needs a refill on cyclobenzaprine 5 mg for her back. States she has back issues and her back tenses up and hurts when sitting too long. States [place] and wanted to get a refill on her muscle relaxer so she does not have any back issues while she is travelling. Medication pended.

## 2024-09-18 RX ORDER — CYCLOBENZAPRINE HCL 5 MG
5 TABLET ORAL NIGHTLY
Qty: 30 TABLET | Refills: 0 | Status: SHIPPED | OUTPATIENT
Start: 2024-09-18

## 2024-09-18 NOTE — TELEPHONE ENCOUNTER
Please review; protocol failed/ has no protocol      Medication is listed as patient reported.      Cori Latham, RN5 days ago     KW  Patient stated that she needs a refill on cyclobenzaprine 5 mg for her back. States she has back issues and her back tenses up and hurts when sitting too long. States she is travelling abroad on 9[place]24/2024 and wanted to get a refill on her muscle relaxer so she does not have any back issues while she is travelling. Medication pended.             Requested Prescriptions   Pending Prescriptions Disp Refills    cyclobenzaprine 5 MG Oral Tab 30 tablet 1     Sig: Take 1 tablet (5 mg total) by mouth nightly.       There is no refill protocol information for this order        Recent Outpatient Visits              7 months ago Left lower quadrant pain    [facility] Gagan Corley DO    Office Visit    1 year ago Encounter for routine adult health examination without abnormal findings    [facility] Ting Eddy DO    [facility] Visit    [facility]    2 years ago Anxiety and depression    [facility] Gagan Corley DO    Office Visit    2 years ago Right ear pain    [facility], Cristofer Lu MD    Office Visit

## 2025-01-19 ENCOUNTER — TELEPHONE (OUTPATIENT)
Facility: CLINIC | Age: 40
End: 2025-01-19

## 2025-01-19 NOTE — TELEPHONE ENCOUNTER
Late entry. Was paged yesterday for question regarding flexeril. She took flexeril 10mg x1, and 20 minutes later started to have increased HR to 120s. Felt off and palpitations. At time of phone call, symptoms have resolved. She was concerned about sx 2/2 reaction to flexeril, although less likely. Sx possibly 2/2 anxiety. She is currently on sertraline and buspirone and will CPM for now. I recommend ED evaluation should sx recur.

## 2025-02-03 NOTE — TELEPHONE ENCOUNTER
Action Requested: Summary for Provider     []  Critical Lab, Recommendations Needed  [] Need Additional Advice  []   FYI    []   Need Orders  [] Need Medications Sent to Pharmacy  []  Other     SUMMARY: c/o hemorrhoids for 2 months with severe itching. Scheduled appointment for today at 4 pm.     Reason for call: Anal Problem  Onset: two months    She has been having hemorrhoids for two months, she has been using the wipes and creams and the suppositories and nothing has helped her. She is not having any diarrhea at this time. She is having a lot of itching and minor bleeding. She is not having any difficulty going to the bathroom. She is not having any pain. She states the itching is very bad and she is agreeable to being seen. Scheduled appointment for today.     Future Appointments   Date Time Provider Department Center   2/3/2025  4:00 PM Gagan Corley,  [facility]       Reason for Disposition   MODERATE-SEVERE rectal itching (i.e., interferes with school, work, or sleep)    Protocols used: Rectal Symptoms-A-OH

## 2025-02-03 NOTE — PROGRESS NOTES
HPI:    Patient ID: Edna Bell is a 40 year old female who presents for hemorrhoids.    HPI  Has issues with hemorrhoids for past 2 months.   They usually come and go, but this has persisted.   Has lots of itching in rectal area.  Having pain in area now too.   Has switched psych meds as some have caused diarrhea or constipation. Most recently had diarrhea closer to 1/19/25. Prior to psych meds, her movements were regular & normal.   Used OTC hemorrhoid suppository and did help, but gave her horrible diarrhea the following day. This was last Thursday.   Used preparation-H but didn't help.   Has blood on toilet paper intermittently. No blood in stool.  Uses bidet at home and works well.   Uses wipes as well.     Past Medical History:    Allergic rhinitis    Anxiety    Arthritis    Cervical herniated disc    COVID-19    Depression    Osteoarthritis    Postpartum depression        Current Outpatient Medications   Medication Sig Dispense Refill    sertraline 25 MG Oral Tab Take 1 tablet (25 mg total) by mouth daily.      hydrocortisone 2.5 % External Cream Place 1 Application rectally 2 (two) times daily for 7 days. 28 g 2    cyclobenzaprine 5 MG Oral Tab Take 1 tablet (5 mg total) by mouth nightly. 30 tablet 0        Allergies[1]    Review of Systems   Constitutional: Negative.    Respiratory: Negative.     Cardiovascular: Negative.    Gastrointestinal:         See HPI. Otherwise negative.   Neurological: Negative.    All other systems reviewed and are negative.           /68   Pulse 102   Ht 5' 5\" (1.651 m)   Wt 142 lb (64.4 kg)   LMP 12/07/2024 (Approximate)   SpO2 99%   BMI 23.63 kg/m²     PHYSICAL EXAM:   Physical Exam  Exam conducted with a chaperone present (Indiana Strickland MA).   Constitutional:       General: She is not in acute distress.     Appearance: Normal appearance. She is well-developed. She is not ill-appearing, toxic-appearing or diaphoretic.   HENT:      Head: Normocephalic and  atraumatic.   Eyes:      Extraocular Movements: Extraocular movements intact.      Conjunctiva/sclera: Conjunctivae normal.   Cardiovascular:      Rate and Rhythm: Normal rate and regular rhythm.      Pulses: Normal pulses.      Heart sounds: Normal heart sounds. No murmur heard.     No friction rub. No gallop.   Pulmonary:      Effort: Pulmonary effort is normal. No respiratory distress.      Breath sounds: Normal breath sounds. No wheezing or rales.   Abdominal:      General: Abdomen is flat. Bowel sounds are normal. There is no distension.      Palpations: Abdomen is soft. There is no mass.      Tenderness: There is no abdominal tenderness. There is no guarding or rebound.      Hernia: No hernia is present.   Genitourinary:     Comments: Small nonbleeding external hemorrhoids  Musculoskeletal:      Cervical back: Neck supple.      Right lower leg: No edema.      Left lower leg: No edema.   Lymphadenopathy:      Cervical: No cervical adenopathy.   Skin:     General: Skin is warm and dry.      Capillary Refill: Capillary refill takes less than 2 seconds.   Neurological:      General: No focal deficit present.      Mental Status: She is alert.   Psychiatric:         Mood and Affect: Mood normal.         Behavior: Behavior normal.         Thought Content: Thought content normal.         Judgment: Judgment normal.             ASSESSMENT/PLAN:     Encounter Diagnosis   Name Primary?    External hemorrhoids Yes       1. External hemorrhoids     -BRBPR likely 2/2 acute on chronic hemorrhoids.   -Discussed hemorrhoid management. Handout provided.   -Trial anusol BID for up to 7 days at a time for focal symptoms.  -Increase fiber intake. Start fiber supplement metamucil once daily.   -To update should symptoms fail to resolve.     Meds This Visit:  Requested Prescriptions     Signed Prescriptions Disp Refills    hydrocortisone 2.5 % External Cream 28 g 2     Sig: Place 1 Application rectally 2 (two) times daily for 7 days.        Imaging & Referrals:  None       Gagan Corley DO  ID#[id number]       [1] No Known Allergies

## (undated) DIAGNOSIS — M54.2 NECK PAIN: Primary | ICD-10-CM

## (undated) DIAGNOSIS — Z20.822 ENCOUNTER FOR PREPROCEDURE SCREENING LABORATORY TESTING FOR COVID-19: ICD-10-CM

## (undated) DIAGNOSIS — Z01.812 ENCOUNTER FOR PREPROCEDURE SCREENING LABORATORY TESTING FOR COVID-19: ICD-10-CM

## (undated) NOTE — LETTER
21          Rachid Back  :  1985      To Whom It May Concern: This patient was seen in our office on 2021 .   Work status:  Patient can work from home up to 3 days a week due to medical conditions until next follow up appointment wi

## (undated) NOTE — LETTER
October 19, 2021         Laura Lamas, [address] [facility]      Patient: Erich Arnold   YOB: 1985   Date of Visit: 10/19/2021       Dear Dr. Arnaldo Monteiro saw your patient, Erich Arnold, on 10/19/2021.  E

## (undated) NOTE — LETTER
Date & Time: 4/21/2024, 9:24 AM  Patient: Edna Guzman  Encounter Provider(s):    Camila Loving PA       To Whom It May Concern:    Edna Guzman was seen and treated in our department on 4/21/2024. She may return to work on 4/23/24.    If you have any questions or concerns, please do not hesitate to call.        _____________________________  Physician/APC Signature

## (undated) NOTE — LETTER
Patient Name: Rich Downs  : 1985  MRN: [id number]  Patient Address: [address]  [facility] [id number]      Coronavirus Disease 2019 (COVID-19)     [facility] is committed to the safety and well-being of our patients, loretta carefully. If your symptoms get worse, call your healthcare provider immediately. 3. Get rest and stay hydrated.    4. If you have a medical appointment, call the healthcare provider ahead of time and tell them that you have or may have COVID-19.  5. For m of fever-reducing medications; and  · Improvement in respiratory symptoms (e.g., cough, shortness of breath); and  · At least 10 days have passed since symptoms first appeared OR if asymptomatic patient or date of symptom onset is unclear then use 10 days donors must:    · Have had a confirmed diagnosis of COVID-19  · Be symptom-free for at least 14 days*    *Some people will be required to have a repeat COVID-19 test in order to be eligible to donate.  If you’re instructed by Silvina that a repeat test is r random. Researchers are trying to identify similarities between people with a Post-COVID condition to better understand if there are risk factors. How do I prevent a Post-COVID condition?   The best way to prevent the long-term symptoms of COVID-19 is

## (undated) NOTE — LETTER
Date: 11/17/2021    Patient Name: Hasmukh Hope          To Whom it may concern: The above patient was seen at the [facility] for treatment of a medical condition.     This patient should be excused from attending work from 11/8 t

## (undated) NOTE — LETTER
21          Jerri Cabral  :  1985      To Whom It May Concern: This patient was seen in our office on 2021. Patient can work from home up to 3 days a week due to chronic pain of neck and increased pain when driving to and from work.

## (undated) NOTE — LETTER
[address]  [facility],  [place]       02/22/21        Patient: Elle Powell   YOB: 1985   Date of Visit: 2/22/2021       Dear  Dr. Rell Linton,      Thank you for referring Elle Powell to my practice.